# Patient Record
Sex: MALE | Race: WHITE | NOT HISPANIC OR LATINO | Employment: FULL TIME | URBAN - NONMETROPOLITAN AREA
[De-identification: names, ages, dates, MRNs, and addresses within clinical notes are randomized per-mention and may not be internally consistent; named-entity substitution may affect disease eponyms.]

---

## 2021-10-31 ENCOUNTER — HOSPITAL ENCOUNTER (EMERGENCY)
Facility: HOSPITAL | Age: 41
Discharge: HOME | End: 2021-10-31
Attending: EMERGENCY MEDICINE
Payer: COMMERCIAL

## 2021-10-31 VITALS
BODY MASS INDEX: 35.8 KG/M2 | DIASTOLIC BLOOD PRESSURE: 76 MMHG | WEIGHT: 294 LBS | TEMPERATURE: 98 F | SYSTOLIC BLOOD PRESSURE: 129 MMHG | HEART RATE: 96 BPM | OXYGEN SATURATION: 99 % | HEIGHT: 76 IN | RESPIRATION RATE: 18 BRPM

## 2021-10-31 DIAGNOSIS — K08.89 PAIN, DENTAL: Primary | ICD-10-CM

## 2021-10-31 PROCEDURE — 99284 EMERGENCY DEPT VISIT MOD MDM: CPT

## 2021-10-31 RX ORDER — HYDROCODONE BITARTRATE AND ACETAMINOPHEN 5; 325 MG/1; MG/1
1 TABLET ORAL EVERY 8 HOURS PRN
Qty: 15 TABLET | Refills: 0 | Status: SHIPPED | OUTPATIENT
Start: 2021-10-31 | End: 2021-11-05

## 2021-10-31 RX ORDER — PENICILLIN V POTASSIUM 500 MG/1
500 TABLET, FILM COATED ORAL 4 TIMES DAILY
Qty: 40 TABLET | Refills: 0 | Status: SHIPPED | OUTPATIENT
Start: 2021-10-31 | End: 2021-11-10

## 2022-05-18 ENCOUNTER — LAB VISIT (OUTPATIENT)
Dept: LAB | Facility: HOSPITAL | Age: 42
End: 2022-05-18
Attending: INTERNAL MEDICINE
Payer: COMMERCIAL

## 2022-05-18 DIAGNOSIS — Z12.5 SPECIAL SCREENING FOR MALIGNANT NEOPLASM OF PROSTATE: ICD-10-CM

## 2022-05-18 DIAGNOSIS — Z13.29 SCREENING FOR THYROID DISORDER: ICD-10-CM

## 2022-05-18 DIAGNOSIS — I10 ESSENTIAL HYPERTENSION, MALIGNANT: ICD-10-CM

## 2022-05-18 DIAGNOSIS — Z00.00 ROUTINE GENERAL MEDICAL EXAMINATION AT A HEALTH CARE FACILITY: Primary | ICD-10-CM

## 2022-05-18 DIAGNOSIS — Z72.0 TOBACCO ABUSE: ICD-10-CM

## 2022-05-18 LAB
ALBUMIN SERPL BCP-MCNC: 4 G/DL (ref 3.5–5.2)
ALP SERPL-CCNC: 63 U/L (ref 55–135)
ALT SERPL W/O P-5'-P-CCNC: 43 U/L (ref 10–44)
ANION GAP SERPL CALC-SCNC: 8 MMOL/L (ref 8–16)
AST SERPL-CCNC: 24 U/L (ref 10–40)
BASOPHILS # BLD AUTO: 0.1 K/UL (ref 0–0.2)
BASOPHILS NFR BLD: 1 % (ref 0–1.9)
BILIRUB SERPL-MCNC: 0.5 MG/DL (ref 0.1–1)
BUN SERPL-MCNC: 18 MG/DL (ref 6–20)
CALCIUM SERPL-MCNC: 9.4 MG/DL (ref 8.7–10.5)
CHLORIDE SERPL-SCNC: 105 MMOL/L (ref 95–110)
CHOLEST SERPL-MCNC: 117 MG/DL (ref 120–199)
CHOLEST/HDLC SERPL: 4.2 {RATIO} (ref 2–5)
CO2 SERPL-SCNC: 24 MMOL/L (ref 23–29)
COMPLEXED PSA SERPL-MCNC: 0.48 NG/ML (ref 0–4)
CREAT SERPL-MCNC: 1.4 MG/DL (ref 0.5–1.4)
DIFFERENTIAL METHOD: ABNORMAL
EOSINOPHIL # BLD AUTO: 0.5 K/UL (ref 0–0.5)
EOSINOPHIL NFR BLD: 5.2 % (ref 0–8)
ERYTHROCYTE [DISTWIDTH] IN BLOOD BY AUTOMATED COUNT: 13.2 % (ref 11.5–14.5)
EST. GFR  (AFRICAN AMERICAN): >60 ML/MIN/1.73 M^2
EST. GFR  (NON AFRICAN AMERICAN): >60 ML/MIN/1.73 M^2
ESTIMATED AVG GLUCOSE: 229 MG/DL (ref 68–131)
GLUCOSE SERPL-MCNC: 199 MG/DL (ref 70–110)
HBA1C MFR BLD: 9.6 % (ref 4–5.6)
HCT VFR BLD AUTO: 50.9 % (ref 40–54)
HDLC SERPL-MCNC: 28 MG/DL (ref 40–75)
HDLC SERPL: 23.9 % (ref 20–50)
HGB BLD-MCNC: 18.2 G/DL (ref 14–18)
IMM GRANULOCYTES # BLD AUTO: 0.1 K/UL (ref 0–0.04)
IMM GRANULOCYTES NFR BLD AUTO: 1 % (ref 0–0.5)
LDLC SERPL CALC-MCNC: 28.2 MG/DL (ref 63–159)
LYMPHOCYTES # BLD AUTO: 2.6 K/UL (ref 1–4.8)
LYMPHOCYTES NFR BLD: 26.4 % (ref 18–48)
MCH RBC QN AUTO: 31 PG (ref 27–31)
MCHC RBC AUTO-ENTMCNC: 35.8 G/DL (ref 32–36)
MCV RBC AUTO: 87 FL (ref 82–98)
MONOCYTES # BLD AUTO: 0.7 K/UL (ref 0.3–1)
MONOCYTES NFR BLD: 7.4 % (ref 4–15)
NEUTROPHILS # BLD AUTO: 5.8 K/UL (ref 1.8–7.7)
NEUTROPHILS NFR BLD: 59 % (ref 38–73)
NONHDLC SERPL-MCNC: 89 MG/DL
NRBC BLD-RTO: 0 /100 WBC
PLATELET # BLD AUTO: 231 K/UL (ref 150–450)
PMV BLD AUTO: 10.1 FL (ref 9.2–12.9)
POTASSIUM SERPL-SCNC: 4.3 MMOL/L (ref 3.5–5.1)
PROT SERPL-MCNC: 7.8 G/DL (ref 6–8.4)
RBC # BLD AUTO: 5.87 M/UL (ref 4.6–6.2)
SODIUM SERPL-SCNC: 137 MMOL/L (ref 136–145)
TRIGL SERPL-MCNC: 304 MG/DL (ref 30–150)
TSH SERPL DL<=0.005 MIU/L-ACNC: 0.89 UIU/ML (ref 0.4–4)
WBC # BLD AUTO: 9.86 K/UL (ref 3.9–12.7)

## 2022-05-18 PROCEDURE — 83036 HEMOGLOBIN GLYCOSYLATED A1C: CPT | Performed by: INTERNAL MEDICINE

## 2022-05-18 PROCEDURE — 80061 LIPID PANEL: CPT | Performed by: INTERNAL MEDICINE

## 2022-05-18 PROCEDURE — 85025 COMPLETE CBC W/AUTO DIFF WBC: CPT | Performed by: INTERNAL MEDICINE

## 2022-05-18 PROCEDURE — 84443 ASSAY THYROID STIM HORMONE: CPT | Performed by: INTERNAL MEDICINE

## 2022-05-18 PROCEDURE — 80053 COMPREHEN METABOLIC PANEL: CPT | Performed by: INTERNAL MEDICINE

## 2022-05-18 PROCEDURE — 36415 COLL VENOUS BLD VENIPUNCTURE: CPT | Performed by: INTERNAL MEDICINE

## 2022-05-18 PROCEDURE — 84153 ASSAY OF PSA TOTAL: CPT | Performed by: INTERNAL MEDICINE

## 2022-09-08 ENCOUNTER — HOSPITAL ENCOUNTER (EMERGENCY)
Facility: HOSPITAL | Age: 42
Discharge: HOME | End: 2022-09-08
Attending: FAMILY MEDICINE
Payer: COMMERCIAL

## 2022-09-08 VITALS
DIASTOLIC BLOOD PRESSURE: 86 MMHG | HEART RATE: 91 BPM | WEIGHT: 277.63 LBS | OXYGEN SATURATION: 98 % | TEMPERATURE: 98 F | HEIGHT: 76 IN | BODY MASS INDEX: 33.81 KG/M2 | SYSTOLIC BLOOD PRESSURE: 137 MMHG | RESPIRATION RATE: 18 BRPM

## 2022-09-08 DIAGNOSIS — U07.1 COVID-19 VIRUS DETECTED: ICD-10-CM

## 2022-09-08 DIAGNOSIS — U07.1 COVID-19: Primary | ICD-10-CM

## 2022-09-08 LAB
CTP QC/QA: YES
SARS-COV-2 RDRP RESP QL NAA+PROBE: POSITIVE

## 2022-09-08 PROCEDURE — U0002 COVID-19 LAB TEST NON-CDC: HCPCS | Performed by: FAMILY MEDICINE

## 2022-09-08 PROCEDURE — 99284 EMERGENCY DEPT VISIT MOD MDM: CPT

## 2022-09-08 RX ORDER — LISINOPRIL AND HYDROCHLOROTHIAZIDE 12.5; 2 MG/1; MG/1
1 TABLET ORAL DAILY
COMMUNITY
Start: 2022-08-05

## 2022-09-08 RX ORDER — SITAGLIPTIN 100 MG/1
100 TABLET, FILM COATED ORAL DAILY
COMMUNITY
Start: 2022-08-31

## 2022-09-08 RX ORDER — CETIRIZINE HYDROCHLORIDE 10 MG/1
10 TABLET ORAL DAILY
Qty: 30 TABLET | Refills: 0 | Status: SHIPPED | OUTPATIENT
Start: 2022-09-08 | End: 2023-09-08

## 2022-09-08 RX ORDER — METFORMIN HYDROCHLORIDE 1000 MG/1
1000 TABLET ORAL 2 TIMES DAILY
Status: ON HOLD | COMMUNITY
Start: 2022-08-31 | End: 2024-02-11

## 2022-09-08 RX ORDER — FENOFIBRATE 145 MG/1
145 TABLET, FILM COATED ORAL NIGHTLY
COMMUNITY
Start: 2022-08-05

## 2022-09-08 RX ORDER — GLIMEPIRIDE 4 MG/1
8 TABLET ORAL DAILY
Status: ON HOLD | COMMUNITY
Start: 2022-05-10 | End: 2024-02-11 | Stop reason: HOSPADM

## 2022-09-08 RX ORDER — ATORVASTATIN CALCIUM 10 MG/1
10 TABLET, FILM COATED ORAL DAILY
COMMUNITY
Start: 2022-08-05

## 2022-09-08 RX ORDER — GLIPIZIDE 10 MG/1
10 TABLET, FILM COATED, EXTENDED RELEASE ORAL EVERY MORNING
COMMUNITY
Start: 2022-08-15

## 2022-09-08 RX ORDER — ONDANSETRON 4 MG/1
4 TABLET, FILM COATED ORAL EVERY 6 HOURS
Qty: 12 TABLET | Refills: 0 | Status: ON HOLD | OUTPATIENT
Start: 2022-09-08 | End: 2024-02-11 | Stop reason: HOSPADM

## 2022-09-08 RX ORDER — ALLOPURINOL 300 MG/1
300 TABLET ORAL DAILY
COMMUNITY
Start: 2022-08-15

## 2022-09-08 NOTE — Clinical Note
"Jose"Penny Gil was seen and treated in our emergency department on 9/8/2022.  He may return to work on 09/14/2022.       If you have any questions or concerns, please don't hesitate to call.      Rudy Helms Jr., MD"

## 2022-09-08 NOTE — Clinical Note
"Jose"Penny Gil was seen and treated in our emergency department on 9/8/2022.     COVID-19 is present in our communities across the state. There is limited testing for COVID at this time, so not all patients can be tested. In this situation, your employee meets the following criteria:    Jose Gil has met the criteria for COVID-19 testing and has a POSITIVE result. He can return to work once they are asymptomatic for 24 hours without the use of fever reducing medications AND at least five days from the first positive result. A mask is recommended for 5 days post quarantine.     If you have any questions or concerns, or if I can be of further assistance, please do not hesitate to contact me.    Sincerely,             Barb Garcia RN"

## 2022-09-08 NOTE — ED PROVIDER NOTES
"Encounter Date: 9/8/2022       History     Chief Complaint   Patient presents with    COVID-19 Concerns     Pt stated he tested positive for COVID this morning and "needs a doctor's test to get paid at work".       URI  The primary symptoms include fatigue, sore throat, cough, nausea, myalgias and arthralgias. Primary symptoms do not include fever, headaches, ear pain, swollen glands, wheezing, abdominal pain, vomiting or rash. The current episode started 1 to 2 hours ago. This is a new problem.   The fatigue began today.   The sore throat began today. The sore throat is not accompanied by trouble swallowing, drooling, hoarse voice or stridor.   The cough began today. The cough is non-productive.   Myalgias began today. The myalgias are generalized. The myalgias are not associated with weakness, tenderness or swelling.   Arthralgias began today. The arthralgias are not associated with joint swelling, limited range of motion or stiffness.   Symptoms associated with the illness include chills and rhinorrhea. The illness is not associated with plugged ear sensation, facial pain, sinus pressure or congestion.   Review of patient's allergies indicates:  No Known Allergies  Past Medical History:   Diagnosis Date    Diabetes mellitus     Gout, unspecified     Hypertension      History reviewed. No pertinent surgical history.  History reviewed. No pertinent family history.     Review of Systems   Constitutional:  Positive for chills and fatigue. Negative for fever.   HENT:  Positive for rhinorrhea and sore throat. Negative for congestion, drooling, ear pain, hoarse voice, sinus pressure and trouble swallowing.    Respiratory:  Positive for cough. Negative for wheezing and stridor.    Gastrointestinal:  Positive for nausea. Negative for abdominal pain and vomiting.   Musculoskeletal:  Positive for arthralgias and myalgias. Negative for stiffness.   Skin:  Negative for rash.   Neurological:  Negative for weakness and " headaches.   All other systems reviewed and are negative.    Physical Exam     Initial Vitals [09/08/22 0842]   BP Pulse Resp Temp SpO2   137/86 91 18 97.6 °F (36.4 °C) 98 %      MAP       --         Physical Exam    Nursing note and vitals reviewed.  Constitutional: Vital signs are normal. He appears well-developed and well-nourished. He is not diaphoretic.  Non-toxic appearance. He does not have a sickly appearance.   HENT:   Head: Normocephalic and atraumatic.   Right Ear: Hearing, tympanic membrane, external ear and ear canal normal.   Left Ear: Tympanic membrane, external ear and ear canal normal.   Nose: Mucosal edema and rhinorrhea present. Right sinus exhibits no maxillary sinus tenderness and no frontal sinus tenderness. Left sinus exhibits no maxillary sinus tenderness and no frontal sinus tenderness.   Mouth/Throat: Uvula is midline, oropharynx is clear and moist and mucous membranes are normal.   Eyes: Conjunctivae, EOM and lids are normal. Pupils are equal, round, and reactive to light. Lids are everted and swept, no foreign bodies found.   Neck: Trachea normal and phonation normal. Neck supple.   Normal range of motion.   Full passive range of motion without pain.     Cardiovascular:  Normal rate, regular rhythm, normal heart sounds, intact distal pulses and normal pulses.           Abdominal: Abdomen is soft. Bowel sounds are normal. There is no abdominal tenderness.   Musculoskeletal:      Cervical back: Normal, full passive range of motion without pain, normal range of motion and neck supple.      Thoracic back: Normal.      Lumbar back: Normal.     Neurological: He is alert and oriented to person, place, and time. He has normal strength. No cranial nerve deficit or sensory deficit.   Skin: Skin is intact.   Psychiatric: He has a normal mood and affect. His speech is normal and behavior is normal.       ED Course   Procedures  Labs Reviewed   SARS-COV-2 RDRP GENE - Abnormal; Notable for the  following components:       Result Value    POC Rapid COVID Positive (*)     All other components within normal limits    Narrative:     This test utilizes isothermal nucleic acid amplification   technology to detect the SARS-CoV-2 RdRp nucleic acid segment.   The analytical sensitivity (limit of detection) is 125 genome   equivalents/mL.   A POSITIVE result implies infection with the SARS-CoV-2 virus;   the patient is presumed to be contagious.     A NEGATIVE result means that SARS-CoV-2 nucleic acids are not   present above the limit of detection. A NEGATIVE result should be   treated as presumptive. It does not rule out the possibility of   COVID-19 and should not be the sole basis for treatment decisions.   If COVID-19 is strongly suspected based on clinical and exposure   history, re-testing using an alternate molecular assay should be   considered.   This test is only for use under the Food and Drug   Administration s Emergency Use Authorization (EUA).   Commercial kits are provided by [employer].   Performance characteristics of the EUA have been independently   verified by [facility] Department of   Pathology and Laboratory Medicine.   _________________________________________________________________   The authorized Fact Sheet for Healthcare Providers and the authorized Fact   Sheet for Patients of the ID NOW COVID-19 are available on the FDA   website:     https://www.fda.gov/media/059071/download  https://www.fda.gov/media/756702/download                  Imaging Results    None          Medications - No data to display  Medical Decision Making:   ED Management:  Patient is afebrile, no acute distress, tolerating p.o. fluids.  No indication for admission at this time.  Discussed the patient risks and benefits medication including side effects.  Patient reports he understands and would like to be started on paxlovid.  Discussed with patient need to follow-up with PCP for repeat exam in  48 hours and symptoms increase or worsen to return to ED or see PCP.  Patient reports he understands and is ready for discharge home                    Clinical Impression:   Final diagnoses:  [U07.1] COVID-19 (Primary)        ED Disposition Condition    Discharge Stable          ED Prescriptions       Medication Sig Dispense Start Date End Date Auth. Provider    nirmatrelvir-ritonavir 300 mg (150 mg x 2)-100 mg copackaged tablets (EUA) Take 3 tablets by mouth 2 (two) times daily for 5 days. Each dose contains 2 nirmatrelvir (pink tablets) and 1 ritonavir (white tablet). Take all 3 tablets together 30 tablet 9/8/2022 9/13/2022 Rudy Helms Jr., MD    ondansetron (ZOFRAN) 4 MG tablet Take 1 tablet (4 mg total) by mouth every 6 (six) hours. 12 tablet 9/8/2022 -- Rudy Helms Jr., MD    cetirizine (ZYRTEC) 10 MG tablet Take 1 tablet (10 mg total) by mouth once daily. 30 tablet 9/8/2022 9/8/2023 Rudy Helms Jr., MD          Follow-up Information       Follow up With Specialties Details Why Contact Info    Annabella Issa MD Internal Medicine In 2 days As needed, If symptoms worsen [address] [address]  [phone]               Rudy Helms Jr., MD  09/08/22 6109

## 2024-02-08 ENCOUNTER — HOSPITAL ENCOUNTER (INPATIENT)
Facility: HOSPITAL | Age: 44
LOS: 3 days | Discharge: HOME | DRG: 617 | End: 2024-02-11
Attending: INTERNAL MEDICINE | Admitting: INTERNAL MEDICINE
Payer: COMMERCIAL

## 2024-02-08 DIAGNOSIS — Z01.818 PREOP EXAMINATION: ICD-10-CM

## 2024-02-08 DIAGNOSIS — M86.9 OSTEOMYELITIS OF GREAT TOE OF RIGHT FOOT: Primary | ICD-10-CM

## 2024-02-08 PROBLEM — M10.9 GOUT: Status: ACTIVE | Noted: 2024-02-08

## 2024-02-08 PROBLEM — E78.2 MIXED HYPERLIPIDEMIA: Status: ACTIVE | Noted: 2024-02-08

## 2024-02-08 PROBLEM — E11.69 TYPE 2 DIABETES MELLITUS WITH OTHER SPECIFIED COMPLICATION: Status: ACTIVE | Noted: 2017-10-20

## 2024-02-08 PROBLEM — I10 HYPERTENSION: Status: ACTIVE | Noted: 2024-02-08

## 2024-02-08 LAB
ALBUMIN SERPL BCP-MCNC: 3.8 G/DL (ref 3.5–5.2)
ALP SERPL-CCNC: 62 U/L (ref 55–135)
ALT SERPL W/O P-5'-P-CCNC: 32 U/L (ref 10–44)
ANION GAP SERPL CALC-SCNC: 7 MMOL/L (ref 3–11)
APTT PPP: 25.8 SEC (ref 21–32)
AST SERPL-CCNC: 14 U/L (ref 10–40)
BILIRUB SERPL-MCNC: 0.3 MG/DL (ref 0.1–1)
BUN SERPL-MCNC: 20 MG/DL (ref 6–20)
CALCIUM SERPL-MCNC: 9.6 MG/DL (ref 8.7–10.5)
CHLORIDE SERPL-SCNC: 106 MMOL/L (ref 95–110)
CO2 SERPL-SCNC: 23 MMOL/L (ref 23–29)
CREAT SERPL-MCNC: 1.4 MG/DL (ref 0.5–1.4)
CRP SERPL-MCNC: 0.72 MG/DL (ref 0–0.75)
ERYTHROCYTE [SEDIMENTATION RATE] IN BLOOD: 3 MM/HR (ref 0–10)
EST. GFR  (NO RACE VARIABLE): >60 ML/MIN/1.73 M^2
GLUCOSE SERPL-MCNC: 245 MG/DL (ref 70–110)
INR PPP: 0.9 (ref 0.8–1.2)
MAGNESIUM SERPL-MCNC: 2.2 MG/DL (ref 1.6–2.6)
OHS QRS DURATION: 82 MS
OHS QTC CALCULATION: 427 MS
POCT GLUCOSE: 244 MG/DL (ref 70–110)
POTASSIUM SERPL-SCNC: 4 MMOL/L (ref 3.5–5.1)
PROT SERPL-MCNC: 7.4 G/DL (ref 6–8.4)
PROTHROMBIN TIME: 10.3 SEC (ref 9–12.5)
SODIUM SERPL-SCNC: 136 MMOL/L (ref 136–145)

## 2024-02-08 PROCEDURE — 36415 COLL VENOUS BLD VENIPUNCTURE: CPT | Mod: XB | Performed by: PODIATRIST

## 2024-02-08 PROCEDURE — 25000003 PHARM REV CODE 250: Performed by: PODIATRIST

## 2024-02-08 PROCEDURE — 93005 ELECTROCARDIOGRAM TRACING: CPT

## 2024-02-08 PROCEDURE — 85651 RBC SED RATE NONAUTOMATED: CPT | Performed by: PODIATRIST

## 2024-02-08 PROCEDURE — 63600175 PHARM REV CODE 636 W HCPCS: Performed by: PODIATRIST

## 2024-02-08 PROCEDURE — 86140 C-REACTIVE PROTEIN: CPT | Performed by: PODIATRIST

## 2024-02-08 PROCEDURE — 36415 COLL VENOUS BLD VENIPUNCTURE: CPT

## 2024-02-08 PROCEDURE — 83735 ASSAY OF MAGNESIUM: CPT

## 2024-02-08 PROCEDURE — 11000001 HC ACUTE MED/SURG PRIVATE ROOM

## 2024-02-08 PROCEDURE — 85610 PROTHROMBIN TIME: CPT | Performed by: PODIATRIST

## 2024-02-08 PROCEDURE — 80053 COMPREHEN METABOLIC PANEL: CPT

## 2024-02-08 PROCEDURE — 85730 THROMBOPLASTIN TIME PARTIAL: CPT | Performed by: PODIATRIST

## 2024-02-08 PROCEDURE — 25000003 PHARM REV CODE 250

## 2024-02-08 PROCEDURE — 87040 BLOOD CULTURE FOR BACTERIA: CPT | Mod: 59 | Performed by: PODIATRIST

## 2024-02-08 PROCEDURE — 93010 ELECTROCARDIOGRAM REPORT: CPT | Mod: ,,, | Performed by: INTERNAL MEDICINE

## 2024-02-08 RX ORDER — HYDROCHLOROTHIAZIDE 12.5 MG/1
12.5 TABLET ORAL DAILY
Status: DISCONTINUED | OUTPATIENT
Start: 2024-02-09 | End: 2024-02-11 | Stop reason: HOSPADM

## 2024-02-08 RX ORDER — FENOFIBRATE 145 MG/1
145 TABLET, FILM COATED ORAL NIGHTLY
Status: DISCONTINUED | OUTPATIENT
Start: 2024-02-08 | End: 2024-02-11 | Stop reason: HOSPADM

## 2024-02-08 RX ORDER — SODIUM CHLORIDE 0.9 % (FLUSH) 0.9 %
10 SYRINGE (ML) INJECTION EVERY 12 HOURS PRN
Status: DISCONTINUED | OUTPATIENT
Start: 2024-02-08 | End: 2024-02-11 | Stop reason: HOSPADM

## 2024-02-08 RX ORDER — ACETAMINOPHEN 325 MG/1
650 TABLET ORAL EVERY 6 HOURS PRN
Status: DISCONTINUED | OUTPATIENT
Start: 2024-02-08 | End: 2024-02-11 | Stop reason: HOSPADM

## 2024-02-08 RX ORDER — LISINOPRIL 20 MG/1
20 TABLET ORAL DAILY
Status: DISCONTINUED | OUTPATIENT
Start: 2024-02-09 | End: 2024-02-11 | Stop reason: HOSPADM

## 2024-02-08 RX ORDER — GLUCAGON 1 MG
1 KIT INJECTION
Status: DISCONTINUED | OUTPATIENT
Start: 2024-02-08 | End: 2024-02-11 | Stop reason: HOSPADM

## 2024-02-08 RX ORDER — ATORVASTATIN CALCIUM 10 MG/1
10 TABLET, FILM COATED ORAL NIGHTLY
Status: DISCONTINUED | OUTPATIENT
Start: 2024-02-08 | End: 2024-02-09

## 2024-02-08 RX ORDER — TALC
6 POWDER (GRAM) TOPICAL NIGHTLY PRN
Status: DISCONTINUED | OUTPATIENT
Start: 2024-02-08 | End: 2024-02-11 | Stop reason: HOSPADM

## 2024-02-08 RX ORDER — ONDANSETRON HYDROCHLORIDE 2 MG/ML
4 INJECTION, SOLUTION INTRAVENOUS EVERY 8 HOURS PRN
Status: DISCONTINUED | OUTPATIENT
Start: 2024-02-08 | End: 2024-02-11 | Stop reason: HOSPADM

## 2024-02-08 RX ORDER — IBUPROFEN 200 MG
16 TABLET ORAL
Status: DISCONTINUED | OUTPATIENT
Start: 2024-02-08 | End: 2024-02-11 | Stop reason: HOSPADM

## 2024-02-08 RX ORDER — IBUPROFEN 200 MG
24 TABLET ORAL
Status: DISCONTINUED | OUTPATIENT
Start: 2024-02-08 | End: 2024-02-11 | Stop reason: HOSPADM

## 2024-02-08 RX ORDER — TRAMADOL HYDROCHLORIDE 50 MG/1
50 TABLET ORAL EVERY 8 HOURS PRN
Status: DISCONTINUED | OUTPATIENT
Start: 2024-02-08 | End: 2024-02-10

## 2024-02-08 RX ORDER — HYDROCODONE BITARTRATE AND ACETAMINOPHEN 5; 325 MG/1; MG/1
1 TABLET ORAL EVERY 8 HOURS PRN
Status: DISCONTINUED | OUTPATIENT
Start: 2024-02-08 | End: 2024-02-10

## 2024-02-08 RX ORDER — ALLOPURINOL 300 MG/1
300 TABLET ORAL DAILY
Status: DISCONTINUED | OUTPATIENT
Start: 2024-02-09 | End: 2024-02-11 | Stop reason: HOSPADM

## 2024-02-08 RX ADMIN — PIPERACILLIN SODIUM AND TAZOBACTAM SODIUM 4.5 G: 4; .5 INJECTION, POWDER, FOR SOLUTION INTRAVENOUS at 02:02

## 2024-02-08 RX ADMIN — PIPERACILLIN SODIUM AND TAZOBACTAM SODIUM 4.5 G: 4; .5 INJECTION, POWDER, FOR SOLUTION INTRAVENOUS at 09:02

## 2024-02-08 RX ADMIN — FENOFIBRATE 145 MG: 145 TABLET, FILM COATED ORAL at 09:02

## 2024-02-08 NOTE — ASSESSMENT & PLAN NOTE
X-ray right foot obtained demonstrates soft tissue abnormality along the medial D IP of the big toe with a 6.5 mm erosive area at the base of the distal phalanx of the big toe 6.5 mm could represent osteomyelitis or lesion from gout.  This is consistent with in office x-ray findings.  Podiatry is following, plans on right great toe amputation tomorrow.  Blood cultures are pending.  On Zosyn for antibiotic coverage.  NPO after midnight.

## 2024-02-08 NOTE — SUBJECTIVE & OBJECTIVE
Past Medical History:   Diagnosis Date    Diabetes mellitus     Gout, unspecified     Hypertension        No past surgical history on file.    Review of patient's allergies indicates:  No Known Allergies    No current facility-administered medications on file prior to encounter.     Current Outpatient Medications on File Prior to Encounter   Medication Sig    allopurinoL (ZYLOPRIM) 300 MG tablet Take 300 mg by mouth once daily.    atorvastatin (LIPITOR) 10 MG tablet Take 10 mg by mouth once daily.    fenofibrate (TRICOR) 145 MG tablet Take 145 mg by mouth every evening.    glimepiride (AMARYL) 4 MG tablet Take 8 mg by mouth once daily.    glipiZIDE (GLUCOTROL) 10 MG TR24 Take 10 mg by mouth every morning.    JANUVIA 100 mg Tab Take 100 mg by mouth once daily.    cetirizine (ZYRTEC) 10 MG tablet Take 1 tablet (10 mg total) by mouth once daily.    lisinopriL-hydrochlorothiazide (PRINZIDE,ZESTORETIC) 20-12.5 mg per tablet Take 1 tablet by mouth once daily.    metFORMIN (GLUCOPHAGE) 1000 MG tablet Take 1,000 mg by mouth 2 (two) times daily.    ondansetron (ZOFRAN) 4 MG tablet Take 1 tablet (4 mg total) by mouth every 6 (six) hours.     Family History    None       Tobacco Use    Smoking status: Not on file    Smokeless tobacco: Not on file   Substance and Sexual Activity    Alcohol use: Not on file    Drug use: Not on file    Sexual activity: Not on file     Review of Systems   Constitutional:  Positive for appetite change. Negative for activity change, chills and fever.   HENT:  Negative for ear pain, mouth sores, nosebleeds and sore throat.    Eyes:  Negative for visual disturbance.   Respiratory:  Negative for cough, shortness of breath and wheezing.    Cardiovascular:  Negative for chest pain, palpitations and leg swelling.   Gastrointestinal:  Negative for abdominal distention, abdominal pain, blood in stool, constipation, diarrhea, nausea and vomiting.   Endocrine: Negative for polyphagia.   Genitourinary:   Negative for difficulty urinating, dysuria, flank pain and frequency.   Musculoskeletal:  Positive for arthralgias. Negative for back pain and myalgias.   Skin:  Positive for wound. Negative for rash.   Neurological:  Negative for dizziness, tremors, seizures, syncope, facial asymmetry, speech difficulty, weakness and headaches.   Hematological:  Negative for adenopathy.   Psychiatric/Behavioral:  Negative for agitation, confusion and hallucinations. The patient is not nervous/anxious.      Objective:     Vital Signs (Most Recent):    Vital Signs (24h Range):        Weight: 125.9 kg (277 lb 9 oz)  Body mass index is 33.79 kg/m².     Physical Exam  Constitutional:       General: He is not in acute distress.     Appearance: Normal appearance. He is obese. He is not ill-appearing or toxic-appearing.   HENT:      Head: Normocephalic and atraumatic.      Nose: No congestion or rhinorrhea.      Mouth/Throat:      Dentition: Abnormal dentition. Dental caries present.      Pharynx: No oropharyngeal exudate.   Eyes:      General: No scleral icterus.  Cardiovascular:      Rate and Rhythm: Normal rate and regular rhythm.      Pulses: Normal pulses.      Heart sounds: Normal heart sounds. No murmur heard.     No friction rub. No gallop.   Pulmonary:      Effort: Pulmonary effort is normal. No respiratory distress.      Breath sounds: Normal breath sounds. No stridor. No wheezing, rhonchi or rales.   Chest:      Chest wall: No tenderness.   Abdominal:      General: Bowel sounds are normal. There is no distension.      Palpations: Abdomen is soft. There is no mass.      Tenderness: There is no abdominal tenderness. There is no right CVA tenderness, left CVA tenderness, guarding or rebound.      Hernia: No hernia is present.   Musculoskeletal:         General: No swelling, tenderness or deformity.      Cervical back: Normal range of motion and neck supple. No rigidity.      Right lower leg: No edema.      Left lower leg: No edema.  "  Lymphadenopathy:      Cervical: No cervical adenopathy.   Skin:     General: Skin is warm and dry.      Capillary Refill: Capillary refill takes less than 2 seconds.      Coloration: Skin is not jaundiced or pale.      Comments: Wound to right great toe   Neurological:      General: No focal deficit present.      Mental Status: He is alert and oriented to person, place, and time.      Motor: No weakness.      Gait: Gait normal.   Psychiatric:         Mood and Affect: Mood normal.         Behavior: Behavior normal.         Thought Content: Thought content normal.         Judgment: Judgment normal.                Significant Labs: All pertinent labs within the past 24 hours have been reviewed.  CBC: No results for input(s): "WBC", "HGB", "HCT", "PLT" in the last 48 hours.  CMP: No results for input(s): "NA", "K", "CL", "CO2", "GLU", "BUN", "CREATININE", "CALCIUM", "PROT", "ALBUMIN", "BILITOT", "ALKPHOS", "AST", "ALT", "ANIONGAP", "EGFRNONAA" in the last 48 hours.    Invalid input(s): "ESTGFAFRICA"    Significant Imaging: I have reviewed all pertinent imaging results/findings within the past 24 hours.  "

## 2024-02-08 NOTE — HPI
"44-year-old  male with a past medical history of hypertension, hyperlipidemia, type 2 diabetes with diabetic neuropathy, gout obesity, tobacco abuse chronic ulcer of right great toe for 1 year directly admitted by Podiatry for anticipated right great toe amputation tomorrow.  Patient failed outpatient oral antibiotics as well as wound care.  In office x-rays demonstrated bony erosion consistent with osteomyelitis.  Chest x-ray obtained negative for any acute cardiopulmonary abnormalities.  X-ray of right foot demonstrates  Soft tissue abnormality along the medial DIP of the big toe with a 6.5 mm wrote a very at the bases distal phalanx of the big toe which could represent osteomyelitis or lesion from gout.  This appears to be consistent with the x-ray obtained in podiatry office. Patient reports he is feeling well other than some discomfort to his right foot, denies any recent fevers, cough, congestion, chest pain, dyspnea.  Patient is declining insulin per sliding scale due to being a , stating "once that is on my history I will never be able to get my license back."    Patient reports he does not check his blood sugars at home, since unsure his most recent levels.   Coags within acceptable limit, sed rate and CRP not elevated.    "

## 2024-02-08 NOTE — PLAN OF CARE
[facility]  Initial Discharge Assessment       Primary Care Provider: Annabella Issa MD    Admission Diagnosis: Osteomyelitis of great toe of right foot [M86.9]    Admission Date: 2/8/2024  Expected Discharge Date:          Payor: [employer] / Plan: [employer] ([employer]) / Product Type: Commercial /     Extended Emergency Contact Information  Primary Emergency Contact: NIMO CUELLAR  Mobile Phone: [phone]  Relation: Spouse  Preferred language: English   needed? No    Discharge Plan A: Home with family, Home Health  Discharge Plan B: Home with family, Home      [facility] [address] [place], [place] [address]  [facility] [address]  Phone: [phone] Fax: [phone]      Initial Assessment (most recent)       Adult Discharge Assessment - 02/08/24 1416          Discharge Assessment    Assessment Type Discharge Planning Assessment     Confirmed/corrected address, phone number and insurance Yes     Confirmed Demographics Correct on Facesheet     Source of Information patient     Reason For Admission Osteomyelitis of great toe of right foot     People in Home child(dhiraj), dependent;spouse     Prior to hospitilization cognitive status: Alert/Oriented     Current cognitive status: Alert/Oriented     Walking or Climbing Stairs Difficulty --   Independent prior to being admitted to the hospital    Home Layout Able to live on 1st floor     Equipment Currently Used at Home glucometer     Readmission within 30 days? No     Patient currently being followed by outpatient case management? No     Do you currently have service(s) that help you manage your care at home? No     Do you take prescription medications? Yes     Do you have prescription coverage? Yes     Coverage [employer] - [employer]) -     Do you have any problems affording any of your prescribed medications? TBD     Is the patient taking medications  "as prescribed? yes     How do you get to doctors appointments? car, drives self     Are you on dialysis? No     Do you take coumadin? No     Discharge Plan A Home with family;Home Health     Discharge Plan B Home with family;Home     DME Needed Upon Discharge  other (see comments)   TBD    Discharge Plan discussed with: Patient        Physical Activity    On average, how many days per week do you engage in moderate to strenuous exercise (like a brisk walk)? 0 days     On average, how many minutes do you engage in exercise at this level? 0 min        Financial Resource Strain    How hard is it for you to pay for the very basics like food, housing, medical care, and heating? Not hard at all   "Medical care."       Housing Stability    In the last 12 months, was there a time when you were not able to pay the mortgage or rent on time? No     In the last 12 months, how many places have you lived? 1     In the last 12 months, was there a time when you did not have a steady place to sleep or slept in a shelter (including now)? No        Transportation Needs    In the past 12 months, has lack of transportation kept you from medical appointments or from getting medications? No     In the past 12 months, has lack of transportation kept you from meetings, work, or from getting things needed for daily living? No        Food Insecurity    Within the past 12 months, you worried that your food would run out before you got the money to buy more. Never true     Within the past 12 months, the food you bought just didn't last and you didn't have money to get more. Never true        Stress    Do you feel stress - tense, restless, nervous, or anxious, or unable to sleep at night because your mind is troubled all the time - these days? Not at all        Social Connections    In a typical week, how many times do you talk on the phone with family, friends, or neighbors? More than three times a week     How often do you get together with " friends or relatives? More than three times a week     How often do you attend Yarsani or Judaism services? Never     Do you belong to any clubs or organizations such as Yarsani groups, unions, fraternal or athletic groups, or school groups? No     How often do you attend meetings of the clubs or organizations you belong to? Never     Are you , , , , never , or living with a partner?         Alcohol Use    Q1: How often do you have a drink containing alcohol? 2-4 times a month     Q2: How many drinks containing alcohol do you have on a typical day when you are drinking? 5 or 6     Q3: How often do you have six or more drinks on one occasion? Never                 Initial discharge assessment is completed. Spoke to the patient in his room. He was awake, alert, and oriented to the questions. The patient is independent, and he still works. He lives with his family. The patient expressed that he is concerned about paying for his hospital stay and medications. I was able to provide the patient with the contact information of Orqu[employer] ESCOTO, who assist patient with financial concerns. I was also able to provide the patient with information to [facility] pharmacy as well. Contact information was left in the patient's room. MICHA/ANGELA will remain available.

## 2024-02-08 NOTE — ASSESSMENT & PLAN NOTE
Patient is glipizide 10 mg daily, 1000 mg b.I.d. Farxiga daily.  Patient is refusing sliding scale insulin due to being fearful that he will not be able to obtain his license to continue to be a .

## 2024-02-09 ENCOUNTER — ANESTHESIA (OUTPATIENT)
Dept: SURGERY | Facility: HOSPITAL | Age: 44
DRG: 617 | End: 2024-02-09
Payer: COMMERCIAL

## 2024-02-09 ENCOUNTER — ANESTHESIA EVENT (OUTPATIENT)
Dept: SURGERY | Facility: HOSPITAL | Age: 44
DRG: 617 | End: 2024-02-09
Payer: COMMERCIAL

## 2024-02-09 LAB
ALBUMIN SERPL BCP-MCNC: 3.4 G/DL (ref 3.5–5.2)
ALP SERPL-CCNC: 62 U/L (ref 55–135)
ALT SERPL W/O P-5'-P-CCNC: 26 U/L (ref 10–44)
ANION GAP SERPL CALC-SCNC: 3 MMOL/L (ref 3–11)
AST SERPL-CCNC: 13 U/L (ref 10–40)
BASOPHILS # BLD AUTO: 0.13 K/UL (ref 0–0.2)
BASOPHILS NFR BLD: 1.2 % (ref 0–1.9)
BILIRUB SERPL-MCNC: 0.3 MG/DL (ref 0.1–1)
BUN SERPL-MCNC: 23 MG/DL (ref 6–20)
CALCIUM SERPL-MCNC: 9.5 MG/DL (ref 8.7–10.5)
CHLORIDE SERPL-SCNC: 107 MMOL/L (ref 95–110)
CO2 SERPL-SCNC: 28 MMOL/L (ref 23–29)
CREAT SERPL-MCNC: 1.5 MG/DL (ref 0.5–1.4)
DIFFERENTIAL METHOD BLD: ABNORMAL
EOSINOPHIL # BLD AUTO: 0.7 K/UL (ref 0–0.5)
EOSINOPHIL NFR BLD: 5.8 % (ref 0–8)
ERYTHROCYTE [DISTWIDTH] IN BLOOD BY AUTOMATED COUNT: 13.1 % (ref 11.5–14.5)
EST. GFR  (NO RACE VARIABLE): 58.5 ML/MIN/1.73 M^2
GLUCOSE SERPL-MCNC: 175 MG/DL (ref 70–110)
HCT VFR BLD AUTO: 47 % (ref 40–54)
HGB BLD-MCNC: 16 G/DL (ref 14–18)
IMM GRANULOCYTES # BLD AUTO: 0.09 K/UL (ref 0–0.04)
IMM GRANULOCYTES NFR BLD AUTO: 0.8 % (ref 0–0.5)
LYMPHOCYTES # BLD AUTO: 3.8 K/UL (ref 1–4.8)
LYMPHOCYTES NFR BLD: 34.2 % (ref 18–48)
MAGNESIUM SERPL-MCNC: 2.1 MG/DL (ref 1.6–2.6)
MCH RBC QN AUTO: 30.5 PG (ref 27–31)
MCHC RBC AUTO-ENTMCNC: 34 G/DL (ref 32–36)
MCV RBC AUTO: 90 FL (ref 82–98)
MONOCYTES # BLD AUTO: 0.9 K/UL (ref 0.3–1)
MONOCYTES NFR BLD: 8.1 % (ref 4–15)
NEUTROPHILS # BLD AUTO: 5.6 K/UL (ref 1.8–7.7)
NEUTROPHILS NFR BLD: 49.9 % (ref 38–73)
NRBC BLD-RTO: 0 /100 WBC
PLATELET # BLD AUTO: 239 K/UL (ref 150–450)
PMV BLD AUTO: 10.6 FL (ref 9.2–12.9)
POCT GLUCOSE: 149 MG/DL (ref 70–110)
POCT GLUCOSE: 266 MG/DL (ref 70–110)
POTASSIUM SERPL-SCNC: 3.9 MMOL/L (ref 3.5–5.1)
PROT SERPL-MCNC: 6.8 G/DL (ref 6–8.4)
RBC # BLD AUTO: 5.24 M/UL (ref 4.6–6.2)
SODIUM SERPL-SCNC: 138 MMOL/L (ref 136–145)
WBC # BLD AUTO: 11.2 K/UL (ref 3.9–12.7)

## 2024-02-09 PROCEDURE — 37000009 HC ANESTHESIA EA ADD 15 MINS: Performed by: PODIATRIST

## 2024-02-09 PROCEDURE — 36415 COLL VENOUS BLD VENIPUNCTURE: CPT

## 2024-02-09 PROCEDURE — 99900035 HC TECH TIME PER 15 MIN (STAT)

## 2024-02-09 PROCEDURE — 0Y6P0Z2 DETACHMENT AT RIGHT 1ST TOE, MID, OPEN APPROACH: ICD-10-PCS | Performed by: PODIATRIST

## 2024-02-09 PROCEDURE — 37000008 HC ANESTHESIA 1ST 15 MINUTES: Performed by: PODIATRIST

## 2024-02-09 PROCEDURE — 63600175 PHARM REV CODE 636 W HCPCS: Performed by: PODIATRIST

## 2024-02-09 PROCEDURE — 25000003 PHARM REV CODE 250: Performed by: INTERNAL MEDICINE

## 2024-02-09 PROCEDURE — 63600175 PHARM REV CODE 636 W HCPCS: Performed by: NURSE ANESTHETIST, CERTIFIED REGISTERED

## 2024-02-09 PROCEDURE — 36000707: Performed by: PODIATRIST

## 2024-02-09 PROCEDURE — 83735 ASSAY OF MAGNESIUM: CPT

## 2024-02-09 PROCEDURE — 94799 UNLISTED PULMONARY SVC/PX: CPT | Mod: XB

## 2024-02-09 PROCEDURE — 94761 N-INVAS EAR/PLS OXIMETRY MLT: CPT

## 2024-02-09 PROCEDURE — 87070 CULTURE OTHR SPECIMN AEROBIC: CPT | Performed by: INTERNAL MEDICINE

## 2024-02-09 PROCEDURE — 36000706: Performed by: PODIATRIST

## 2024-02-09 PROCEDURE — 85025 COMPLETE CBC W/AUTO DIFF WBC: CPT

## 2024-02-09 PROCEDURE — 25000003 PHARM REV CODE 250

## 2024-02-09 PROCEDURE — 99900031 HC PATIENT EDUCATION (STAT)

## 2024-02-09 PROCEDURE — 25000003 PHARM REV CODE 250: Performed by: NURSE ANESTHETIST, CERTIFIED REGISTERED

## 2024-02-09 PROCEDURE — 87075 CULTR BACTERIA EXCEPT BLOOD: CPT | Performed by: INTERNAL MEDICINE

## 2024-02-09 PROCEDURE — 80053 COMPREHEN METABOLIC PANEL: CPT

## 2024-02-09 PROCEDURE — 11000001 HC ACUTE MED/SURG PRIVATE ROOM

## 2024-02-09 PROCEDURE — 87147 CULTURE TYPE IMMUNOLOGIC: CPT | Performed by: INTERNAL MEDICINE

## 2024-02-09 PROCEDURE — 25000003 PHARM REV CODE 250: Performed by: PODIATRIST

## 2024-02-09 RX ORDER — LIDOCAINE HYDROCHLORIDE 10 MG/ML
INJECTION, SOLUTION INTRAVENOUS
Status: DISCONTINUED | OUTPATIENT
Start: 2024-02-09 | End: 2024-02-09

## 2024-02-09 RX ORDER — METFORMIN HYDROCHLORIDE 500 MG/1
500 TABLET ORAL 2 TIMES DAILY WITH MEALS
Status: DISCONTINUED | OUTPATIENT
Start: 2024-02-09 | End: 2024-02-11 | Stop reason: HOSPADM

## 2024-02-09 RX ORDER — BUPIVACAINE HYDROCHLORIDE 5 MG/ML
INJECTION, SOLUTION PERINEURAL
Status: DISCONTINUED | OUTPATIENT
Start: 2024-02-09 | End: 2024-02-09 | Stop reason: HOSPADM

## 2024-02-09 RX ORDER — PROPOFOL 10 MG/ML
INJECTION, EMULSION INTRAVENOUS
Status: DISCONTINUED | OUTPATIENT
Start: 2024-02-09 | End: 2024-02-09

## 2024-02-09 RX ORDER — ATORVASTATIN CALCIUM 10 MG/1
10 TABLET, FILM COATED ORAL DAILY
Status: DISCONTINUED | OUTPATIENT
Start: 2024-02-09 | End: 2024-02-11 | Stop reason: HOSPADM

## 2024-02-09 RX ORDER — MIDAZOLAM HYDROCHLORIDE 1 MG/ML
INJECTION INTRAMUSCULAR; INTRAVENOUS
Status: DISCONTINUED | OUTPATIENT
Start: 2024-02-09 | End: 2024-02-09

## 2024-02-09 RX ORDER — SODIUM CHLORIDE 9 MG/ML
INJECTION, SOLUTION INTRAVENOUS CONTINUOUS PRN
Status: DISCONTINUED | OUTPATIENT
Start: 2024-02-09 | End: 2024-02-09

## 2024-02-09 RX ORDER — FENTANYL CITRATE 50 UG/ML
INJECTION, SOLUTION INTRAMUSCULAR; INTRAVENOUS
Status: DISCONTINUED | OUTPATIENT
Start: 2024-02-09 | End: 2024-02-09

## 2024-02-09 RX ORDER — ONDANSETRON HYDROCHLORIDE 2 MG/ML
INJECTION, SOLUTION INTRAVENOUS
Status: DISCONTINUED | OUTPATIENT
Start: 2024-02-09 | End: 2024-02-09

## 2024-02-09 RX ADMIN — FENTANYL CITRATE 50 MCG: 50 INJECTION INTRAMUSCULAR; INTRAVENOUS at 11:02

## 2024-02-09 RX ADMIN — LISINOPRIL 20 MG: 20 TABLET ORAL at 08:02

## 2024-02-09 RX ADMIN — PROPOFOL 50 MG: 10 INJECTION, EMULSION INTRAVENOUS at 11:02

## 2024-02-09 RX ADMIN — LIDOCAINE HYDROCHLORIDE 50 MG: 10 INJECTION, SOLUTION INTRAVENOUS at 11:02

## 2024-02-09 RX ADMIN — PIPERACILLIN SODIUM AND TAZOBACTAM SODIUM 4.5 G: 4; .5 INJECTION, POWDER, FOR SOLUTION INTRAVENOUS at 06:02

## 2024-02-09 RX ADMIN — MIDAZOLAM 2 MG: 1 INJECTION INTRAMUSCULAR; INTRAVENOUS at 11:02

## 2024-02-09 RX ADMIN — PIPERACILLIN SODIUM AND TAZOBACTAM SODIUM 4.5 G: 4; .5 INJECTION, POWDER, FOR SOLUTION INTRAVENOUS at 02:02

## 2024-02-09 RX ADMIN — PROPOFOL 100 MG: 10 INJECTION, EMULSION INTRAVENOUS at 11:02

## 2024-02-09 RX ADMIN — PIPERACILLIN SODIUM AND TAZOBACTAM SODIUM 4.5 G: 4; .5 INJECTION, POWDER, FOR SOLUTION INTRAVENOUS at 10:02

## 2024-02-09 RX ADMIN — TRAMADOL HYDROCHLORIDE 50 MG: 50 TABLET, COATED ORAL at 08:02

## 2024-02-09 RX ADMIN — ALLOPURINOL 300 MG: 300 TABLET ORAL at 08:02

## 2024-02-09 RX ADMIN — ATORVASTATIN CALCIUM 10 MG: 10 TABLET, FILM COATED ORAL at 08:02

## 2024-02-09 RX ADMIN — ONDANSETRON 4 MG: 2 INJECTION INTRAMUSCULAR; INTRAVENOUS at 11:02

## 2024-02-09 RX ADMIN — FENOFIBRATE 145 MG: 145 TABLET, FILM COATED ORAL at 08:02

## 2024-02-09 RX ADMIN — HYDROCHLOROTHIAZIDE 12.5 MG: 12.5 TABLET ORAL at 08:02

## 2024-02-09 RX ADMIN — SODIUM CHLORIDE: 0.9 INJECTION, SOLUTION INTRAVENOUS at 11:02

## 2024-02-09 RX ADMIN — METFORMIN HYDROCHLORIDE 500 MG: 500 TABLET, FILM COATED ORAL at 04:02

## 2024-02-09 NOTE — SUBJECTIVE & OBJECTIVE
Past Medical History:   Diagnosis Date    Diabetes mellitus     Gout, unspecified     Hypertension        No past surgical history on file.    Review of patient's allergies indicates:  No Known Allergies    No current facility-administered medications on file prior to encounter.     Current Outpatient Medications on File Prior to Encounter   Medication Sig    allopurinoL (ZYLOPRIM) 300 MG tablet Take 300 mg by mouth once daily.    atorvastatin (LIPITOR) 10 MG tablet Take 10 mg by mouth once daily.    fenofibrate (TRICOR) 145 MG tablet Take 145 mg by mouth every evening.    glimepiride (AMARYL) 4 MG tablet Take 8 mg by mouth once daily.    glipiZIDE (GLUCOTROL) 10 MG TR24 Take 10 mg by mouth every morning.    JANUVIA 100 mg Tab Take 100 mg by mouth once daily.    cetirizine (ZYRTEC) 10 MG tablet Take 1 tablet (10 mg total) by mouth once daily.    lisinopriL-hydrochlorothiazide (PRINZIDE,ZESTORETIC) 20-12.5 mg per tablet Take 1 tablet by mouth once daily.    metFORMIN (GLUCOPHAGE) 1000 MG tablet Take 1,000 mg by mouth 2 (two) times daily.    ondansetron (ZOFRAN) 4 MG tablet Take 1 tablet (4 mg total) by mouth every 6 (six) hours.     Family History    None       Tobacco Use    Smoking status: Not on file    Smokeless tobacco: Not on file   Substance and Sexual Activity    Alcohol use: Not on file    Drug use: Not on file    Sexual activity: Not on file     Review of Systems   Constitutional:  Positive for appetite change. Negative for activity change, chills and fever.   HENT:  Negative for ear pain, mouth sores, nosebleeds and sore throat.    Eyes:  Negative for visual disturbance.   Respiratory:  Negative for cough, shortness of breath and wheezing.    Cardiovascular:  Negative for chest pain, palpitations and leg swelling.   Gastrointestinal:  Negative for abdominal distention, abdominal pain, blood in stool, constipation, diarrhea, nausea and vomiting.   Endocrine: Negative for polyphagia.   Genitourinary:   Negative for difficulty urinating, dysuria, flank pain and frequency.   Musculoskeletal:  Positive for arthralgias. Negative for back pain and myalgias.   Skin:  Positive for wound. Negative for rash.   Neurological:  Negative for dizziness, tremors, seizures, syncope, facial asymmetry, speech difficulty, weakness and headaches.   Hematological:  Negative for adenopathy.   Psychiatric/Behavioral:  Negative for agitation, confusion and hallucinations. The patient is not nervous/anxious.      Objective:     Vital Signs (Most Recent):  Temp: 98.6 °F (37 °C) (02/09/24 0734)  Pulse: 89 (02/09/24 0734)  Resp: 19 (02/09/24 0734)  BP: 125/77 (02/09/24 0734)  SpO2: 99 % (02/09/24 0734) Vital Signs (24h Range):  Temp:  [98.3 °F (36.8 °C)-98.6 °F (37 °C)] 98.6 °F (37 °C)  Pulse:  [] 89  Resp:  [18-20] 19  SpO2:  [96 %-99 %] 99 %  BP: (115-139)/(74-83) 125/77     Weight: 125.9 kg (277 lb 9 oz)  Body mass index is 33.79 kg/m².     Physical Exam  Constitutional:       General: He is not in acute distress.     Appearance: Normal appearance. He is obese. He is not ill-appearing or toxic-appearing.   HENT:      Head: Normocephalic and atraumatic.      Nose: No congestion or rhinorrhea.      Mouth/Throat:      Dentition: Abnormal dentition. Dental caries present.      Pharynx: No oropharyngeal exudate.   Eyes:      General: No scleral icterus.  Cardiovascular:      Rate and Rhythm: Normal rate and regular rhythm.      Pulses: Normal pulses.      Heart sounds: Normal heart sounds. No murmur heard.     No friction rub. No gallop.   Pulmonary:      Effort: Pulmonary effort is normal. No respiratory distress.      Breath sounds: Normal breath sounds. No stridor. No wheezing, rhonchi or rales.   Chest:      Chest wall: No tenderness.   Abdominal:      General: Bowel sounds are normal. There is no distension.      Palpations: Abdomen is soft. There is no mass.      Tenderness: There is no abdominal tenderness. There is no right CVA  tenderness, left CVA tenderness, guarding or rebound.      Hernia: No hernia is present.   Musculoskeletal:         General: No swelling, tenderness or deformity.      Cervical back: Normal range of motion and neck supple. No rigidity.      Right lower leg: No edema.      Left lower leg: No edema.   Lymphadenopathy:      Cervical: No cervical adenopathy.   Skin:     General: Skin is warm and dry.      Capillary Refill: Capillary refill takes less than 2 seconds.      Coloration: Skin is not jaundiced or pale.      Comments: Wound to right great toe   Neurological:      General: No focal deficit present.      Mental Status: He is alert and oriented to person, place, and time.      Motor: No weakness.      Gait: Gait normal.   Psychiatric:         Mood and Affect: Mood normal.         Behavior: Behavior normal.         Thought Content: Thought content normal.         Judgment: Judgment normal.                Significant Labs: All pertinent labs within the past 24 hours have been reviewed.  CBC:   Recent Labs   Lab 02/09/24  0313   WBC 11.20   HGB 16.0   HCT 47.0        CMP:   Recent Labs   Lab 02/08/24  0928 02/09/24  0314    138   K 4.0 3.9    107   CO2 23 28   * 175*   BUN 20 23*   CREATININE 1.4 1.5*   CALCIUM 9.6 9.5   PROT 7.4 6.8   ALBUMIN 3.8 3.4*   BILITOT 0.3 0.3   ALKPHOS 62 62   AST 14 13   ALT 32 26   ANIONGAP 7 3       Significant Imaging: I have reviewed all pertinent imaging results/findings within the past 24 hours.

## 2024-02-09 NOTE — TRANSFER OF CARE
Anesthesia Transfer of Care Note    Patient: Jose Gil    Procedure(s) Performed: Procedure(s) (LRB):  AMPUTATION, TOE (Right)    Patient location: Other: OR    Anesthesia Type: MAC    Transport from OR: Transported from OR on room air with adequate spontaneous ventilation    Post pain: adequate analgesia    Post assessment: no apparent anesthetic complications    Post vital signs: stable    Level of consciousness: awake    Nausea/Vomiting: no nausea/vomiting    Complications: none    Transfer of care protocol was followed      Last vitals:   98% O2 SAT  104 HR  98/53  16 RR  37 C TEMP

## 2024-02-09 NOTE — OP NOTE
Surgeon:  JEVON Daigle DPM      Preoperative diagnosis:  DM ulceration right great toe with osteomyelitis     Postoperative diagnosis:  DM ulceration right great toe with osteomyelitis     Procedures performed:  Partial right great toe amputation     Anesthesia:  Local, 18 mL 0.5% Marcaine plain, with MAC     Materials: 2.0 vicryl, 3.0 vicryl and 3.0 nylon     Hemostasis:  Well-padded pneumatic ankle tourniquet set at 250 mmHg for 24 minutes     Findings:  Soft bone with yellow discoloration to base of distal phalanx and head of proximal phalanx, purulent drainage present.  Proximal aspect of proximal phalanx with healthy appearance, good bone density and integrity.     Estimated blood loss:  < 5 mL     Complications:  None     Indications for procedure:  The patient is a 44-year-old male known to me from outpatient setting with chronic right great toe ulceration at hallux IP joint, admitted to [facility] with underlying osteomyelitis and need for toe amputation.  He is tolerating IV antibiotic therapy well.  Planned procedure has been discussed in great detail with the patient and his wife, including possible risks and complications such as pain, bleeding, swelling, worsening of infection, need for additional surgical procedures and/or amputations.  All questions were answered.  Informed consent was obtained and placed in patient's chart.       Procedure in detail:  On 02/09/24, the patient was brought into the operating room via cart and placed on the operating table in a supine position.  After the administration of MAC anesthesia, 18ml 0.5% marcaine plain was used to anesthetize the 1st ray in field block fashion.  The right foot was then prepped and draped in the usual aseptic technique.  Time-out was then performed by the OR staff to ensure correct patient, procedure, limb designation.  At this time, anesthesia was checked and deemed adequate.       Attention was directed to the right hallux, racquet  type full-thickness incision was made overlying the proximal phalanx of the toe, encompassing both plantar and dorsal wounds, purulent drainage was present.  The distal phalanx was then freed from any soft tissues, and the toe disarticulated at the DIP joint, passed from the field to be sent as specimen - the base was yellow in color and soft.  Deep culture was taken at this time.  The proximal phalanx was then inspected and freed from its soft tissues, and the sagittal saw used to remove distal 3/4 of bone, leaving the base intact - this is to be sent as clearance fragment.  The surgical site was then copiously irrigated using 1 liter of normal saline.  2-0 and 3-0 Vicryl was used to reapproximate deep tissues.  3-0 nylon was then used to reapproximate skin edges in horizontal suture technique.  The tourniquet was then deflated, and prompt hyperemic response was noted to skin flaps.       The surgical site was then dressed with adaptic, 4 x 4 gauze, aniceto and Kerlix, secured with Ace wrap.  The patient tolerated both anesthesia and procedure well.  He was transported back to [facility] unit with vital signs stable and neurovascular status intact to amputation flaps. We will continue with IV antibiotics, following wound cultures closely.  He has been instructed to keep right foot elevated, bathroom privileges/heel touch weight-bearing in surgical shoe on this right foot.

## 2024-02-09 NOTE — ASSESSMENT & PLAN NOTE
Chronic, controlled. Latest blood pressure and vitals reviewed-     Temp:  [98.3 °F (36.8 °C)-98.6 °F (37 °C)]   Pulse:  []   Resp:  [18-19]   BP: (115-130)/(74-83)   SpO2:  [97 %-99 %] .   Home meds for hypertension were reviewed and noted below.   Hypertension Medications               lisinopriL-hydrochlorothiazide (PRINZIDE,ZESTORETIC) 20-12.5 mg per tablet Take 1 tablet by mouth once daily.            While in the hospital, will manage blood pressure as follows; Continue home antihypertensive regimen    Will utilize p.r.n. blood pressure medication only if patient's  systolic blood pressure greater than 175 and he develops symptoms such as worsening chest pain or shortness of breath.

## 2024-02-09 NOTE — BRIEF OP NOTE
[facility] Surg  Brief Operative Note    SUMMARY     Surgery Date: 2/9/2024     Surgeon(s) and Role:     * Franco Mallory, DPM - Primary    Assisting Surgeon: None    Pre-op Diagnosis:  Osteomyelitis of great toe of right foot [M86.9]    Post-op Diagnosis:  Post-Op Diagnosis Codes:     * Osteomyelitis of great toe of right foot [M86.9]    Procedure(s) (LRB):  AMPUTATION, TOE (Right)    Anesthesia: Local 18 ml 0.5% marcaine plain with MAC    Implants:  * No implants in log *    Operative Findings: Soft, yellow bone at site of ulceration with purulent drainage, no visible tophi.  Bone of proximal phalanx with much healthier appearance    Estimated Blood Loss: * No values recorded between 2/9/2024 11:26 AM and 2/9/2024 12:05 PM *    Estimated Blood Loss has not been documented. EBL = < 5ml.         Specimens:   Specimen (24h ago, onward)       Start     Ordered    02/09/24 1135  Specimen to Pathology, Surgery General Surgery  Once        Comments: Pre-op Diagnosis: Osteomyelitis of great toe of right foot [M86.9]Procedure(s):AMPUTATION, TOE Number of specimens: 2Name of specimens: 1. Right great toe @ 72916. Clearance fragment right great toe @1145     References:    Click here for ordering Quick Tip   Question Answer Comment   Procedure Type: General Surgery    Which provider would you like to cc? FRANCO MALLORY    Release to patient Immediate        02/09/24 1151                    [id number]

## 2024-02-09 NOTE — HOSPITAL COURSE
2/9 DL:  Patient doing well this morning.  He is sitting up in bed and is awake, alert, oriented.  Labs and vital signs stable.  Patient is scheduled to go to OR per Podiatry today.  Continue current IV antibiotic therapy.  Anticipate 1-2 days inpatient IV antibiotic therapy post procedure.    2/10 GT:  Patient went to the OR yesterday with Podiatry and had an amputation of his right great toe.  Deep cultures as well as tissue was sent for analysis, and those are pending.  Patient is to have bathroom privileges with heel-toe weight-bearing while in the surgical shoe.  Patient's metformin was restarted yesterday, blood sugars noted.  Patient's H&H is stable, he is afebrile, and he reports he is feeling well.  Patient reports relief of discomfort with p.r.n. pain medication.  Potential DC tomorrow.  Continue with podiatry recs.    2/11 ND pt feeling better - podiatry saw pt and he can be dc'd today with close fu in podiatry clinic

## 2024-02-09 NOTE — PLAN OF CARE
NPO at midnight. Patient denies pain or complaints.      Problem: Adult Inpatient Plan of Care  Goal: Plan of Care Review  Outcome: Ongoing, Progressing  Goal: Patient-Specific Goal (Individualized)  Outcome: Ongoing, Progressing  Goal: Absence of Hospital-Acquired Illness or Injury  Outcome: Ongoing, Progressing  Goal: Optimal Comfort and Wellbeing  Outcome: Ongoing, Progressing  Goal: Readiness for Transition of Care  Outcome: Ongoing, Progressing     Problem: Diabetes Comorbidity  Goal: Blood Glucose Level Within Targeted Range  Outcome: Ongoing, Progressing

## 2024-02-09 NOTE — ASSESSMENT & PLAN NOTE
X-ray right foot obtained demonstrates soft tissue abnormality along the medial D IP of the big toe with a 6.5 mm erosive area at the base of the distal phalanx of the big toe 6.5 mm could represent osteomyelitis or lesion from gout.  This is consistent with in office x-ray findings.  Podiatry is following, plans on right great toe amputation tomorrow.  Blood cultures are pending.  On Zosyn for antibiotic coverage.  NPO after midnight.     37.5

## 2024-02-09 NOTE — ANESTHESIA PREPROCEDURE EVALUATION
02/09/2024  Jose Gil is a 44 y.o., male.      Pre-op Assessment    I have reviewed the Patient Summary Reports.    I have reviewed the NPO Status.   I have reviewed the Medications.     Review of Systems  Anesthesia Hx:  No problems with previous Anesthesia             Denies Family Hx of Anesthesia complications.    Denies Personal Hx of Anesthesia complications.                    Social:  Smoker       Cardiovascular:     Hypertension, well controlled              ECG has been reviewed.                          Pulmonary:  Pulmonary Normal                       Renal/:  Renal/ Normal                 Hepatic/GI:  Hepatic/GI Normal                 Neurological:  Neurology Normal                                      Endocrine:  Diabetes, well controlled, type 2             Lab Results   Component Value Date    WBC 11.20 02/09/2024    HGB 16.0 02/09/2024    HCT 47.0 02/09/2024    MCV 90 02/09/2024     02/09/2024      CMP  Sodium   Date Value Ref Range Status   02/09/2024 138 136 - 145 mmol/L Final     Potassium   Date Value Ref Range Status   02/09/2024 3.9 3.5 - 5.1 mmol/L Final     Chloride   Date Value Ref Range Status   02/09/2024 107 95 - 110 mmol/L Final     CO2   Date Value Ref Range Status   02/09/2024 28 23 - 29 mmol/L Final     Glucose   Date Value Ref Range Status   02/09/2024 175 (H) 70 - 110 mg/dL Final     BUN   Date Value Ref Range Status   02/09/2024 23 (H) 6 - 20 mg/dL Final     Creatinine   Date Value Ref Range Status   02/09/2024 1.5 (H) 0.5 - 1.4 mg/dL Final     Calcium   Date Value Ref Range Status   02/09/2024 9.5 8.7 - 10.5 mg/dL Final     Total Protein   Date Value Ref Range Status   02/09/2024 6.8 6.0 - 8.4 g/dL Final     Albumin   Date Value Ref Range Status   02/09/2024 3.4 (L) 3.5 - 5.2 g/dL Final     Total Bilirubin   Date Value Ref Range Status   02/09/2024 0.3  0.1 - 1.0 mg/dL Final     Comment:     For infants and newborns, interpretation of results should be based  on gestational age, weight and in agreement with clinical  observations.    Premature Infant recommended reference ranges:  Up to 24 hours.............<8.0 mg/dL  Up to 48 hours............<12.0 mg/dL  3-5 days..................<15.0 mg/dL  6-29 days.................<15.0 mg/dL    For patients on Eltrombopag therapy, use of Dimension [place] TBIL is   not   recommended.       Alkaline Phosphatase   Date Value Ref Range Status   02/09/2024 62 55 - 135 U/L Final     AST   Date Value Ref Range Status   02/09/2024 13 10 - 40 U/L Final     ALT   Date Value Ref Range Status   02/09/2024 26 10 - 44 U/L Final     Anion Gap   Date Value Ref Range Status   02/09/2024 3 3 - 11 mmol/L Final     eGFR   Date Value Ref Range Status   02/09/2024 58.5 (A) >60 mL/min/1.73 m^2 Final        Physical Exam  General: Well nourished    Airway:  Mallampati: III / III  Mouth Opening: Normal  TM Distance: Normal  Tongue: Normal  Neck ROM: Normal ROM    Dental:  Periodontal disease    Chest/Lungs:  Clear to auscultation    Heart:  Rate: Normal  Rhythm: Regular Rhythm  Sounds: Normal        Anesthesia Plan  Type of Anesthesia, risks & benefits discussed:    Anesthesia Type: Gen Supraglottic Airway, MAC  Intra-op Monitoring Plan: Standard ASA Monitors  Post Op Pain Control Plan: multimodal analgesia  Induction:  IV  Airway Plan: Direct  Informed Consent: Informed consent signed with the Patient and all parties understand the risks and agree with anesthesia plan.  All questions answered.   ASA Score: 3  Day of Surgery Review of History & Physical: I have interviewed and examined the patient. I have reviewed the patient's H&P dated: There are no significant changes.     Ready For Surgery From Anesthesia Perspective.     .

## 2024-02-09 NOTE — CONSULTS
[facility]  Podiatry  Consult Note    Patient Name: Jose Gil  MRN: [id number]  Admission Date: 2/8/2024  Hospital Length of Stay: 0 days  Attending Physician: Maria Alejandra Lindquist MD  Primary Care Provider: Annabella Issa MD     Inpatient consult to Podiatry  Consult performed by: Franco Daigle DPM  Consult ordered by: Franco Daigle DPM  Reason for consult: right great toe wound  Assessment/Recommendations: Patient know to me from outpatient setting with chronic wound, underlying osteomyelitis. Plan for OR 2/9/24 for amputation.  Informed consent signed.  Patient NPO after midnight.        Subjective:     History of Present Illness:  Patient is a 44-year-old male, with past medical history significant for diabetes, hypertension, hyperlipidemia, gout, tobacco abuse, well known to me from outpatient setting due to chronic right great toe ulceration.  Patient has failed outpatient antibiotics, local wound care.  In office x-rays revealed bony destruction consistent with osteomyelitis at site of ulceration.  Treatment options have been discussed with patient, patient admitted to [facility] for IV antibiotic therapy as well as amputation of right great toe, partial 1st ray amputation.  Patient seen at bedside this evening, wife present, tolerating IV antibiotic therapy well.  He has minimal discomfort to right great toe secondary to neuropathy.  He denies any fever, chills or other systemic sign of infection.    Scheduled Meds:   [START ON 2/9/2024] allopurinoL  300 mg Oral Daily    atorvastatin  10 mg Oral QHS    fenofibrate  145 mg Oral QHS    [START ON 2/9/2024] hydroCHLOROthiazide  12.5 mg Oral Daily    [START ON 2/9/2024] lisinopriL  20 mg Oral Daily    piperacillin-tazobactam (Zosyn) IV (PEDS and ADULTS) (extended infusion is not appropriate)  4.5 g Intravenous Q8H     Continuous Infusions:  PRN Meds:acetaminophen, acetaminophen, dextrose 10%, dextrose 10%, glucagon (human  recombinant), glucose, glucose, HYDROcodone-acetaminophen, melatonin, ondansetron, sodium chloride 0.9%, traMADoL    Review of patient's allergies indicates:  No Known Allergies     Past Medical History:   Diagnosis Date    Diabetes mellitus     Gout, unspecified     Hypertension      No past surgical history on file.    Family History    None       Tobacco Use    Smoking status: Not on file    Smokeless tobacco: Not on file   Substance and Sexual Activity    Alcohol use: Not on file    Drug use: Not on file    Sexual activity: Not on file     Review of Systems   Constitutional:  Negative for chills and fever.   HENT:  Negative for ear pain.    Eyes:  Negative for visual disturbance.   Respiratory:  Negative for cough and shortness of breath.    Cardiovascular:  Negative for chest pain and leg swelling.   Gastrointestinal:  Negative for nausea and vomiting.   Musculoskeletal:  Positive for arthralgias.   Skin:  Positive for wound.   Neurological:  Negative for weakness.   Psychiatric/Behavioral:  Negative for confusion.      Objective:     Vital Signs (Most Recent):  Temp: 98.3 °F (36.8 °C) (02/08/24 2011)  Pulse: 100 (02/08/24 2011)  Resp: 18 (02/08/24 2011)  BP: 130/83 (02/08/24 2011)  SpO2: 97 % (02/08/24 2011) Vital Signs (24h Range):  Temp:  [98.3 °F (36.8 °C)] 98.3 °F (36.8 °C)  Pulse:  [100-106] 100  Resp:  [18-20] 18  SpO2:  [96 %-97 %] 97 %  BP: (130-139)/(81-83) 130/83     Weight: 125.9 kg (277 lb 9 oz)  Body mass index is 33.79 kg/m².    Foot Exam    General  General Appearance: appears stated age and healthy   Orientation: alert and oriented to person, place, and time   Affect: appropriate       Right Foot/Ankle     Inspection and Palpation  Ecchymosis: none  Tenderness: great toe interphalangeal joint   Swelling: great toe interphalangeal joint   Hammertoes: second toe, third toe, fourth toe and fifth toe  Hallux limitus: yes  Skin Exam: ulcer (0.5cm fibrogranular ulcer at hallux IP joint, probing to  underlying bone and to dorsal aspect, tenting skin, no active drainage today, erythema/hyperpigmentation surrounding wound extending to MTP joint) and erythema;     Neurovascular  Dorsalis pedis: 2+  Posterior tibial: 2+  Saphenous nerve sensation: diminished  Tibial nerve sensation: diminished  Superficial peroneal nerve sensation: diminished  Deep peroneal nerve sensation: diminished  Sural nerve sensation: diminished    Muscle Strength  Ankle dorsiflexion: 4  Ankle plantar flexion: 4  Ankle inversion: 4  Ankle eversion: 4      Left Foot/Ankle      Inspection and Palpation  Ecchymosis: none  Tenderness: none   Swelling: none   Arch: normal  Hammertoes: second toe, third toe, fourth toe and fifth toe  Claw toes: absent  Hallux valgus: no  Hallux limitus: no  Skin Exam: skin intact;     Neurovascular  Dorsalis pedis: 2+  Posterior tibial: 2+  Saphenous nerve sensation: diminished  Tibial nerve sensation: diminished  Superficial peroneal nerve sensation: diminished  Deep peroneal nerve sensation: diminished  Sural nerve sensation: diminished    Muscle Strength  Ankle dorsiflexion: 4  Ankle plantar flexion: 4  Ankle inversion: 4  Ankle eversion: 4          Laboratory:    CMP:   Recent Labs   Lab 02/08/24  0928   *   CALCIUM 9.6   ALBUMIN 3.8   PROT 7.4      K 4.0   CO2 23      BUN 20   CREATININE 1.4   ALKPHOS 62   ALT 32   AST 14   BILITOT 0.3     Coagulation:   Recent Labs   Lab 02/08/24  0928   INR 0.9   APTT 25.8     CRP:   Recent Labs   Lab 02/08/24  0928   CRP 0.72     ESR:   Recent Labs   Lab 02/08/24  0928   SEDRATE 3       Diagnostic Results:  X-Ray: I have reviewed all pertinent results/findings within the past 24 hours.  Right foot xray - Soft tissue abnormality along medial DIPJ joint of the big toe with erosive area at base of distal phalanx, could represent osteomyelitis or lesion from gout .  Chest xray - no acute lung disease        Assessment/Plan:     Active Diagnoses:    Diagnosis  Date Noted POA    PRINCIPAL PROBLEM:  Osteomyelitis of great toe of right foot [M86.9] 02/08/2024 Yes    Gout [M10.9] 02/08/2024 Yes    Hypertension [I10] 02/08/2024 Yes    Mixed hyperlipidemia [E78.2] 02/08/2024 Yes    Type 2 diabetes mellitus with other specified complication [E11.69] 10/20/2017 Yes      Problems Resolved During this Admission:       Osteomyelitis of great toe right foot  Bony erosion on x-ray consistent with osteo, as it is at site of chronic ulceration.  Patient has failed outpatient antibiotic therapy, local wound care.  Extensive discussion had with patient and his wife regarding benefit of amputation, plan for procedure under MAC with local sedation.  Planned procedure discussed in great detail, all questions were answered.  Informed consent signed and placed in patient's chart.  Patient is NPO after midnight, to OR on 02/09/2024.  Continue IV antibiotics at this time.    Type 2 diabetes, hyperlipidemia, hypertension, gout  All managed per primary service.    Thank you for your consult. I will follow-up with patient. Please contact us if you have any additional questions.    Franco Daigle, HAFSA  Podiatry  [facility]

## 2024-02-09 NOTE — PROGRESS NOTES
"[facility] Medicine  Progress Note    Patient Name: Jose Gil  MRN: [id number]  Patient Class: IP- Inpatient   Admission Date: 2/8/2024  Length of Stay: 1 days  Attending Physician: Maria Alejandra Lindquist MD  Primary Care Provider: Annabella Issa MD        Subjective:     Principal Problem:Osteomyelitis of great toe of right foot        HPI:  44-year-old  male with a past medical history of hypertension, hyperlipidemia, type 2 diabetes with diabetic neuropathy, gout obesity, tobacco abuse chronic ulcer of right great toe for 1 year directly admitted by Podiatry for anticipated right great toe amputation tomorrow.  Patient failed outpatient oral antibiotics as well as wound care.  In office x-rays demonstrated bony erosion consistent with osteomyelitis.  Chest x-ray obtained negative for any acute cardiopulmonary abnormalities.  X-ray of right foot demonstrates  Soft tissue abnormality along the medial DIP of the big toe with a 6.5 mm wrote a very at the bases distal phalanx of the big toe which could represent osteomyelitis or lesion from gout.  This appears to be consistent with the x-ray obtained in podiatry office. Patient reports he is feeling well other than some discomfort to his right foot, denies any recent fevers, cough, congestion, chest pain, dyspnea.  Patient is declining insulin per sliding scale due to being a , stating "once that is on my history I will never be able to get my license back."    Patient reports he does not check his blood sugars at home, since unsure his most recent levels.   Coags within acceptable limit, sed rate and CRP not elevated.      Overview/Hospital Course:  2/9 DL:  Patient doing well this morning.  He is sitting up in bed and is awake, alert, oriented.  Labs and vital signs stable.  Patient is scheduled to go to OR per Podiatry today.  Continue current IV antibiotic therapy.  Anticipate 1-2 days inpatient IV antibiotic " therapy post procedure.    Past Medical History:   Diagnosis Date    Diabetes mellitus     Gout, unspecified     Hypertension        No past surgical history on file.    Review of patient's allergies indicates:  No Known Allergies    No current facility-administered medications on file prior to encounter.     Current Outpatient Medications on File Prior to Encounter   Medication Sig    allopurinoL (ZYLOPRIM) 300 MG tablet Take 300 mg by mouth once daily.    atorvastatin (LIPITOR) 10 MG tablet Take 10 mg by mouth once daily.    fenofibrate (TRICOR) 145 MG tablet Take 145 mg by mouth every evening.    glimepiride (AMARYL) 4 MG tablet Take 8 mg by mouth once daily.    glipiZIDE (GLUCOTROL) 10 MG TR24 Take 10 mg by mouth every morning.    JANUVIA 100 mg Tab Take 100 mg by mouth once daily.    cetirizine (ZYRTEC) 10 MG tablet Take 1 tablet (10 mg total) by mouth once daily.    lisinopriL-hydrochlorothiazide (PRINZIDE,ZESTORETIC) 20-12.5 mg per tablet Take 1 tablet by mouth once daily.    metFORMIN (GLUCOPHAGE) 1000 MG tablet Take 1,000 mg by mouth 2 (two) times daily.    ondansetron (ZOFRAN) 4 MG tablet Take 1 tablet (4 mg total) by mouth every 6 (six) hours.     Family History    None       Tobacco Use    Smoking status: Not on file    Smokeless tobacco: Not on file   Substance and Sexual Activity    Alcohol use: Not on file    Drug use: Not on file    Sexual activity: Not on file     Review of Systems   Constitutional:  Positive for appetite change. Negative for activity change, chills and fever.   HENT:  Negative for ear pain, mouth sores, nosebleeds and sore throat.    Eyes:  Negative for visual disturbance.   Respiratory:  Negative for cough, shortness of breath and wheezing.    Cardiovascular:  Negative for chest pain, palpitations and leg swelling.   Gastrointestinal:  Negative for abdominal distention, abdominal pain, blood in stool, constipation, diarrhea, nausea and vomiting.   Endocrine: Negative for  polyphagia.   Genitourinary:  Negative for difficulty urinating, dysuria, flank pain and frequency.   Musculoskeletal:  Positive for arthralgias. Negative for back pain and myalgias.   Skin:  Positive for wound. Negative for rash.   Neurological:  Negative for dizziness, tremors, seizures, syncope, facial asymmetry, speech difficulty, weakness and headaches.   Hematological:  Negative for adenopathy.   Psychiatric/Behavioral:  Negative for agitation, confusion and hallucinations. The patient is not nervous/anxious.      Objective:     Vital Signs (Most Recent):  Temp: 98.6 °F (37 °C) (02/09/24 0734)  Pulse: 89 (02/09/24 0734)  Resp: 19 (02/09/24 0734)  BP: 125/77 (02/09/24 0734)  SpO2: 99 % (02/09/24 0734) Vital Signs (24h Range):  Temp:  [98.3 °F (36.8 °C)-98.6 °F (37 °C)] 98.6 °F (37 °C)  Pulse:  [] 89  Resp:  [18-20] 19  SpO2:  [96 %-99 %] 99 %  BP: (115-139)/(74-83) 125/77     Weight: 125.9 kg (277 lb 9 oz)  Body mass index is 33.79 kg/m².     Physical Exam  Constitutional:       General: He is not in acute distress.     Appearance: Normal appearance. He is obese. He is not ill-appearing or toxic-appearing.   HENT:      Head: Normocephalic and atraumatic.      Nose: No congestion or rhinorrhea.      Mouth/Throat:      Dentition: Abnormal dentition. Dental caries present.      Pharynx: No oropharyngeal exudate.   Eyes:      General: No scleral icterus.  Cardiovascular:      Rate and Rhythm: Normal rate and regular rhythm.      Pulses: Normal pulses.      Heart sounds: Normal heart sounds. No murmur heard.     No friction rub. No gallop.   Pulmonary:      Effort: Pulmonary effort is normal. No respiratory distress.      Breath sounds: Normal breath sounds. No stridor. No wheezing, rhonchi or rales.   Chest:      Chest wall: No tenderness.   Abdominal:      General: Bowel sounds are normal. There is no distension.      Palpations: Abdomen is soft. There is no mass.      Tenderness: There is no abdominal  tenderness. There is no right CVA tenderness, left CVA tenderness, guarding or rebound.      Hernia: No hernia is present.   Musculoskeletal:         General: No swelling, tenderness or deformity.      Cervical back: Normal range of motion and neck supple. No rigidity.      Right lower leg: No edema.      Left lower leg: No edema.   Lymphadenopathy:      Cervical: No cervical adenopathy.   Skin:     General: Skin is warm and dry.      Capillary Refill: Capillary refill takes less than 2 seconds.      Coloration: Skin is not jaundiced or pale.      Comments: Wound to right great toe   Neurological:      General: No focal deficit present.      Mental Status: He is alert and oriented to person, place, and time.      Motor: No weakness.      Gait: Gait normal.   Psychiatric:         Mood and Affect: Mood normal.         Behavior: Behavior normal.         Thought Content: Thought content normal.         Judgment: Judgment normal.                Significant Labs: All pertinent labs within the past 24 hours have been reviewed.  CBC:   Recent Labs   Lab 02/09/24  0313   WBC 11.20   HGB 16.0   HCT 47.0        CMP:   Recent Labs   Lab 02/08/24  0928 02/09/24  0314    138   K 4.0 3.9    107   CO2 23 28   * 175*   BUN 20 23*   CREATININE 1.4 1.5*   CALCIUM 9.6 9.5   PROT 7.4 6.8   ALBUMIN 3.8 3.4*   BILITOT 0.3 0.3   ALKPHOS 62 62   AST 14 13   ALT 32 26   ANIONGAP 7 3       Significant Imaging: I have reviewed all pertinent imaging results/findings within the past 24 hours.    Assessment/Plan:      * Osteomyelitis of great toe of right foot   X-ray right foot obtained demonstrates soft tissue abnormality along the medial D IP of the big toe with a 6.5 mm erosive area at the base of the distal phalanx of the big toe 6.5 mm could represent osteomyelitis or lesion from gout.  This is consistent with in office x-ray findings.  Podiatry is following, plans on right great toe amputation tomorrow.  Blood  cultures are pending.  On Zosyn for antibiotic coverage.  NPO after midnight.      Mixed hyperlipidemia   Resume fenofibrate and atorvastatin.      Type 2 diabetes mellitus with other specified complication  Patient is glipizide 10 mg daily, 1000 mg b.I.d. Farxiga daily.  Patient is refusing sliding scale insulin due to being fearful that he will not be able to obtain his license to continue to be a .      Hypertension  Chronic, controlled. Latest blood pressure and vitals reviewed-     Temp:  [98.3 °F (36.8 °C)-98.6 °F (37 °C)]   Pulse:  []   Resp:  [18-19]   BP: (115-130)/(74-83)   SpO2:  [97 %-99 %] .   Home meds for hypertension were reviewed and noted below.   Hypertension Medications               lisinopriL-hydrochlorothiazide (PRINZIDE,ZESTORETIC) 20-12.5 mg per tablet Take 1 tablet by mouth once daily.            While in the hospital, will manage blood pressure as follows; Continue home antihypertensive regimen    Will utilize p.r.n. blood pressure medication only if patient's  systolic blood pressure greater than 175 and he develops symptoms such as worsening chest pain or shortness of breath.    Gout  Continue home dose of allopurinol.        VTE Risk Mitigation (From admission, onward)           Ordered     Place sequential compression device  Until discontinued         02/08/24 0811     IP VTE LOW RISK PATIENT  Once         02/08/24 0811                    Discharge Planning   PIETRO:      Code Status: Full Code   Is the patient medically ready for discharge?:     Reason for patient still in hospital (select all that apply): Patient trending condition, Treatment, and Consult recommendations  Discharge Plan A: Home with family, Home Health                  Marta Ugarte NP  Department of Hospital Medicine   [facility]

## 2024-02-10 PROBLEM — S98.111A AMPUTATION OF RIGHT GREAT TOE: Status: ACTIVE | Noted: 2024-02-10

## 2024-02-10 LAB
ALBUMIN SERPL BCP-MCNC: 3.6 G/DL (ref 3.5–5.2)
ALP SERPL-CCNC: 54 U/L (ref 55–135)
ALT SERPL W/O P-5'-P-CCNC: 31 U/L (ref 10–44)
ANION GAP SERPL CALC-SCNC: 4 MMOL/L (ref 3–11)
AST SERPL-CCNC: 17 U/L (ref 10–40)
BASOPHILS # BLD AUTO: 0.12 K/UL (ref 0–0.2)
BASOPHILS NFR BLD: 1 % (ref 0–1.9)
BILIRUB SERPL-MCNC: 0.6 MG/DL (ref 0.1–1)
BUN SERPL-MCNC: 22 MG/DL (ref 6–20)
CALCIUM SERPL-MCNC: 9.7 MG/DL (ref 8.7–10.5)
CHLORIDE SERPL-SCNC: 105 MMOL/L (ref 95–110)
CO2 SERPL-SCNC: 27 MMOL/L (ref 23–29)
CREAT SERPL-MCNC: 1.5 MG/DL (ref 0.5–1.4)
DIFFERENTIAL METHOD BLD: ABNORMAL
EOSINOPHIL # BLD AUTO: 0.5 K/UL (ref 0–0.5)
EOSINOPHIL NFR BLD: 4.6 % (ref 0–8)
ERYTHROCYTE [DISTWIDTH] IN BLOOD BY AUTOMATED COUNT: 13.2 % (ref 11.5–14.5)
EST. GFR  (NO RACE VARIABLE): 58.5 ML/MIN/1.73 M^2
GLUCOSE SERPL-MCNC: 220 MG/DL (ref 70–110)
HCT VFR BLD AUTO: 47.4 % (ref 40–54)
HGB BLD-MCNC: 16.3 G/DL (ref 14–18)
IMM GRANULOCYTES # BLD AUTO: 0.09 K/UL (ref 0–0.04)
IMM GRANULOCYTES NFR BLD AUTO: 0.8 % (ref 0–0.5)
LYMPHOCYTES # BLD AUTO: 2.6 K/UL (ref 1–4.8)
LYMPHOCYTES NFR BLD: 22.5 % (ref 18–48)
MAGNESIUM SERPL-MCNC: 2.3 MG/DL (ref 1.6–2.6)
MCH RBC QN AUTO: 30.9 PG (ref 27–31)
MCHC RBC AUTO-ENTMCNC: 34.4 G/DL (ref 32–36)
MCV RBC AUTO: 90 FL (ref 82–98)
MONOCYTES # BLD AUTO: 1 K/UL (ref 0.3–1)
MONOCYTES NFR BLD: 8.8 % (ref 4–15)
NEUTROPHILS # BLD AUTO: 7.2 K/UL (ref 1.8–7.7)
NEUTROPHILS NFR BLD: 62.3 % (ref 38–73)
NRBC BLD-RTO: 0 /100 WBC
PLATELET # BLD AUTO: 237 K/UL (ref 150–450)
PMV BLD AUTO: 10.3 FL (ref 9.2–12.9)
POCT GLUCOSE: 143 MG/DL (ref 70–110)
POCT GLUCOSE: 204 MG/DL (ref 70–110)
POCT GLUCOSE: 241 MG/DL (ref 70–110)
POTASSIUM SERPL-SCNC: 4.3 MMOL/L (ref 3.5–5.1)
PROT SERPL-MCNC: 7.1 G/DL (ref 6–8.4)
RBC # BLD AUTO: 5.28 M/UL (ref 4.6–6.2)
SODIUM SERPL-SCNC: 136 MMOL/L (ref 136–145)
WBC # BLD AUTO: 11.56 K/UL (ref 3.9–12.7)

## 2024-02-10 PROCEDURE — 85025 COMPLETE CBC W/AUTO DIFF WBC: CPT

## 2024-02-10 PROCEDURE — 25000003 PHARM REV CODE 250: Performed by: INTERNAL MEDICINE

## 2024-02-10 PROCEDURE — 94799 UNLISTED PULMONARY SVC/PX: CPT | Mod: XB

## 2024-02-10 PROCEDURE — 36415 COLL VENOUS BLD VENIPUNCTURE: CPT

## 2024-02-10 PROCEDURE — 80053 COMPREHEN METABOLIC PANEL: CPT

## 2024-02-10 PROCEDURE — 99900035 HC TECH TIME PER 15 MIN (STAT)

## 2024-02-10 PROCEDURE — 83735 ASSAY OF MAGNESIUM: CPT

## 2024-02-10 PROCEDURE — 99900031 HC PATIENT EDUCATION (STAT)

## 2024-02-10 PROCEDURE — 63600175 PHARM REV CODE 636 W HCPCS: Performed by: PODIATRIST

## 2024-02-10 PROCEDURE — 11000001 HC ACUTE MED/SURG PRIVATE ROOM

## 2024-02-10 PROCEDURE — 25000003 PHARM REV CODE 250: Performed by: PODIATRIST

## 2024-02-10 PROCEDURE — 25000003 PHARM REV CODE 250

## 2024-02-10 RX ORDER — HYDROCODONE BITARTRATE AND ACETAMINOPHEN 5; 325 MG/1; MG/1
1 TABLET ORAL EVERY 6 HOURS PRN
Status: DISCONTINUED | OUTPATIENT
Start: 2024-02-10 | End: 2024-02-11 | Stop reason: HOSPADM

## 2024-02-10 RX ORDER — HYDROCODONE BITARTRATE AND ACETAMINOPHEN 10; 325 MG/1; MG/1
1 TABLET ORAL EVERY 6 HOURS PRN
Status: DISCONTINUED | OUTPATIENT
Start: 2024-02-10 | End: 2024-02-11 | Stop reason: HOSPADM

## 2024-02-10 RX ADMIN — HYDROCODONE BITARTRATE AND ACETAMINOPHEN 1 TABLET: 5; 325 TABLET ORAL at 04:02

## 2024-02-10 RX ADMIN — HYDROCODONE BITARTRATE AND ACETAMINOPHEN 1 TABLET: 10; 325 TABLET ORAL at 10:02

## 2024-02-10 RX ADMIN — PIPERACILLIN SODIUM AND TAZOBACTAM SODIUM 4.5 G: 4; .5 INJECTION, POWDER, FOR SOLUTION INTRAVENOUS at 02:02

## 2024-02-10 RX ADMIN — FENOFIBRATE 145 MG: 145 TABLET, FILM COATED ORAL at 09:02

## 2024-02-10 RX ADMIN — HYDROCODONE BITARTRATE AND ACETAMINOPHEN 1 TABLET: 10; 325 TABLET ORAL at 09:02

## 2024-02-10 RX ADMIN — ALLOPURINOL 300 MG: 300 TABLET ORAL at 08:02

## 2024-02-10 RX ADMIN — HYDROCODONE BITARTRATE AND ACETAMINOPHEN 1 TABLET: 10; 325 TABLET ORAL at 03:02

## 2024-02-10 RX ADMIN — ATORVASTATIN CALCIUM 10 MG: 10 TABLET, FILM COATED ORAL at 08:02

## 2024-02-10 RX ADMIN — HYDROCHLOROTHIAZIDE 12.5 MG: 12.5 TABLET ORAL at 08:02

## 2024-02-10 RX ADMIN — METFORMIN HYDROCHLORIDE 500 MG: 500 TABLET, FILM COATED ORAL at 05:02

## 2024-02-10 RX ADMIN — LISINOPRIL 20 MG: 20 TABLET ORAL at 08:02

## 2024-02-10 RX ADMIN — PIPERACILLIN SODIUM AND TAZOBACTAM SODIUM 4.5 G: 4; .5 INJECTION, POWDER, FOR SOLUTION INTRAVENOUS at 10:02

## 2024-02-10 RX ADMIN — PIPERACILLIN SODIUM AND TAZOBACTAM SODIUM 4.5 G: 4; .5 INJECTION, POWDER, FOR SOLUTION INTRAVENOUS at 06:02

## 2024-02-10 RX ADMIN — METFORMIN HYDROCHLORIDE 500 MG: 500 TABLET, FILM COATED ORAL at 08:02

## 2024-02-10 NOTE — ASSESSMENT & PLAN NOTE
Patient is glipizide 10 mg daily, 1000 mg b.I.d. Farxiga daily.  Patient is refusing sliding scale insulin due to being fearful that he will not be able to obtain his license to continue to be a .    2/10 GT:  Continue metformin at current dose.  Patient can resume his full dose as well as Farxiga on an outpatient basis.

## 2024-02-10 NOTE — PROGRESS NOTES
"[facility] Medicine  Progress Note    Patient Name: Jose Gil  MRN: [id number]  Patient Class: IP- Inpatient   Admission Date: 2/8/2024  Length of Stay: 2 days  Attending Physician: Maria Alejandra Lindquist MD  Primary Care Provider: Annabella Issa MD        Subjective:     Principal Problem:Osteomyelitis of great toe of right foot        HPI:  44-year-old  male with a past medical history of hypertension, hyperlipidemia, type 2 diabetes with diabetic neuropathy, gout obesity, tobacco abuse chronic ulcer of right great toe for 1 year directly admitted by Podiatry for anticipated right great toe amputation tomorrow.  Patient failed outpatient oral antibiotics as well as wound care.  In office x-rays demonstrated bony erosion consistent with osteomyelitis.  Chest x-ray obtained negative for any acute cardiopulmonary abnormalities.  X-ray of right foot demonstrates  Soft tissue abnormality along the medial DIP of the big toe with a 6.5 mm wrote a very at the bases distal phalanx of the big toe which could represent osteomyelitis or lesion from gout.  This appears to be consistent with the x-ray obtained in podiatry office. Patient reports he is feeling well other than some discomfort to his right foot, denies any recent fevers, cough, congestion, chest pain, dyspnea.  Patient is declining insulin per sliding scale due to being a , stating "once that is on my history I will never be able to get my license back."    Patient reports he does not check his blood sugars at home, since unsure his most recent levels.   Coags within acceptable limit, sed rate and CRP not elevated.      Overview/Hospital Course:  2/9 DL:  Patient doing well this morning.  He is sitting up in bed and is awake, alert, oriented.  Labs and vital signs stable.  Patient is scheduled to go to OR per Podiatry today.  Continue current IV antibiotic therapy.  Anticipate 1-2 days inpatient IV antibiotic " therapy post procedure.    2/10 GT:  Patient went to the OR yesterday with Podiatry and had an amputation of his right great toe.  Deep cultures as well as tissue was sent for analysis, and those are pending.  Patient is to have bathroom privileges with heel-toe weight-bearing while in the surgical shoe.  Patient's metformin was restarted yesterday, blood sugars noted.  Patient's H&H is stable, he is afebrile, and he reports he is feeling well.  Patient reports relief of discomfort with p.r.n. pain medication.  Potential DC tomorrow.  Continue with podiatry recs.    Past Medical History:   Diagnosis Date    Diabetes mellitus     Gout, unspecified     Hypertension        No past surgical history on file.    Review of patient's allergies indicates:  No Known Allergies    No current facility-administered medications on file prior to encounter.     Current Outpatient Medications on File Prior to Encounter   Medication Sig    allopurinoL (ZYLOPRIM) 300 MG tablet Take 300 mg by mouth once daily.    atorvastatin (LIPITOR) 10 MG tablet Take 10 mg by mouth once daily.    fenofibrate (TRICOR) 145 MG tablet Take 145 mg by mouth every evening.    glimepiride (AMARYL) 4 MG tablet Take 8 mg by mouth once daily.    glipiZIDE (GLUCOTROL) 10 MG TR24 Take 10 mg by mouth every morning.    JANUVIA 100 mg Tab Take 100 mg by mouth once daily.    cetirizine (ZYRTEC) 10 MG tablet Take 1 tablet (10 mg total) by mouth once daily.    lisinopriL-hydrochlorothiazide (PRINZIDE,ZESTORETIC) 20-12.5 mg per tablet Take 1 tablet by mouth once daily.    metFORMIN (GLUCOPHAGE) 1000 MG tablet Take 1,000 mg by mouth 2 (two) times daily.    ondansetron (ZOFRAN) 4 MG tablet Take 1 tablet (4 mg total) by mouth every 6 (six) hours.     Family History    None       Tobacco Use    Smoking status: Not on file    Smokeless tobacco: Not on file   Substance and Sexual Activity    Alcohol use: Not on file    Drug use: Not on file    Sexual activity: Not on file      Review of Systems   Constitutional:  Positive for appetite change. Negative for activity change, chills and fever.   HENT:  Negative for ear pain, mouth sores, nosebleeds and sore throat.    Eyes:  Negative for visual disturbance.   Respiratory:  Negative for cough, shortness of breath and wheezing.    Cardiovascular:  Negative for chest pain, palpitations and leg swelling.   Gastrointestinal:  Negative for abdominal distention, abdominal pain, blood in stool, constipation, diarrhea, nausea and vomiting.   Endocrine: Negative for polyphagia.   Genitourinary:  Negative for difficulty urinating, dysuria, flank pain and frequency.   Musculoskeletal:  Positive for arthralgias. Negative for back pain and myalgias.   Skin:  Positive for wound. Negative for rash.   Neurological:  Negative for dizziness, tremors, seizures, syncope, facial asymmetry, speech difficulty, weakness and headaches.   Hematological:  Negative for adenopathy.   Psychiatric/Behavioral:  Negative for agitation, confusion and hallucinations. The patient is not nervous/anxious.      Objective:     Vital Signs (Most Recent):  Temp: 98.3 °F (36.8 °C) (02/10/24 0745)  Pulse: 86 (02/10/24 0745)  Resp: 20 (02/10/24 0745)  BP: 107/70 (02/10/24 0803)  SpO2: 97 % (02/10/24 0745) Vital Signs (24h Range):  Temp:  [97.5 °F (36.4 °C)-98.3 °F (36.8 °C)] 98.3 °F (36.8 °C)  Pulse:  [] 86  Resp:  [16-20] 20  SpO2:  [96 %-98 %] 97 %  BP: (106-136)/(70-86) 107/70     Weight: 125.9 kg (277 lb 9 oz)  Body mass index is 33.79 kg/m².     Physical Exam  Constitutional:       General: He is not in acute distress.     Appearance: Normal appearance. He is obese. He is not ill-appearing or toxic-appearing.   HENT:      Head: Normocephalic and atraumatic.      Nose: No congestion or rhinorrhea.      Mouth/Throat:      Dentition: Abnormal dentition. Dental caries present.      Pharynx: No oropharyngeal exudate.   Eyes:      General: No scleral icterus.  Cardiovascular:       Rate and Rhythm: Normal rate and regular rhythm.      Pulses: Normal pulses.      Heart sounds: Normal heart sounds. No murmur heard.     No friction rub. No gallop.   Pulmonary:      Effort: Pulmonary effort is normal. No respiratory distress.      Breath sounds: Normal breath sounds. No stridor. No wheezing, rhonchi or rales.   Chest:      Chest wall: No tenderness.   Abdominal:      General: Bowel sounds are normal. There is no distension.      Palpations: Abdomen is soft. There is no mass.      Tenderness: There is no abdominal tenderness. There is no right CVA tenderness, left CVA tenderness, guarding or rebound.      Hernia: No hernia is present.   Musculoskeletal:         General: No swelling, tenderness or deformity.      Cervical back: Normal range of motion and neck supple. No rigidity.      Right lower leg: No edema.      Left lower leg: No edema.      Comments: Right foot surgical dressing noted to be intact s/p right great toe amputation   Lymphadenopathy:      Cervical: No cervical adenopathy.   Skin:     General: Skin is warm and dry.      Capillary Refill: Capillary refill takes less than 2 seconds.      Coloration: Skin is not jaundiced or pale.   Neurological:      General: No focal deficit present.      Mental Status: He is alert and oriented to person, place, and time.      Motor: No weakness.      Gait: Gait normal.   Psychiatric:         Mood and Affect: Mood normal.         Behavior: Behavior normal.         Thought Content: Thought content normal.         Judgment: Judgment normal.                Significant Labs: All pertinent labs within the past 24 hours have been reviewed.  CBC:   Recent Labs   Lab 02/09/24 0313 02/10/24  0559   WBC 11.20 11.56   HGB 16.0 16.3   HCT 47.0 47.4    237       CMP:   Recent Labs   Lab 02/08/24  0928 02/09/24  0314 02/10/24  0559    138 136   K 4.0 3.9 4.3    107 105   CO2 23 28 27   * 175* 220*   BUN 20 23* 22*   CREATININE 1.4  1.5* 1.5*   CALCIUM 9.6 9.5 9.7   PROT 7.4 6.8 7.1   ALBUMIN 3.8 3.4* 3.6   BILITOT 0.3 0.3 0.6   ALKPHOS 62 62 54*   AST 14 13 17   ALT 32 26 31   ANIONGAP 7 3 4         Significant Imaging: I have reviewed all pertinent imaging results/findings within the past 24 hours.    Assessment/Plan:      * Osteomyelitis of great toe of right foot   X-ray right foot obtained demonstrates soft tissue abnormality along the medial D IP of the big toe with a 6.5 mm erosive area at the base of the distal phalanx of the big toe 6.5 mm could represent osteomyelitis or lesion from gout.  This is consistent with in office x-ray findings.  Podiatry is following, plans on right great toe amputation tomorrow.  Blood cultures are pending.  On Zosyn for antibiotic coverage.  NPO after midnight.    2/10 GT:  Continue with current antibiotic regimen.  Patient had a right great toe amputation with Podiatry yesterday.  Tissue and deep cultures are pending.    Amputation of right great toe  Patient went to the OR on 2/9/24 with podiatry and had an amputation of the right great toe.  Deep cultures and tissues for cultures were sent and are pending.  Patient is heel touch weight-bearing with ortho shoe for bathroom privileges, and also needs to keep right lower extremity elevated.  Continue with podiatry recs.  Continue with current antibiotic regimen for treatment of osteomyelitis.      Mixed hyperlipidemia   Continue fenofibrate and atorvastatin.      Type 2 diabetes mellitus with other specified complication  Patient is glipizide 10 mg daily, 1000 mg b.I.d. Farxiga daily.  Patient is refusing sliding scale insulin due to being fearful that he will not be able to obtain his license to continue to be a .    2/10 GT:  Continue metformin at current dose.  Patient can resume his full dose as well as Farxiga on an outpatient basis.      Hypertension  Chronic, controlled. Latest blood pressure and vitals reviewed-     Temp:  [97.5 °F  (36.4 °C)-98.3 °F (36.8 °C)]   Pulse:  []   Resp:  [16-20]   BP: (106-136)/(70-86)   SpO2:  [96 %-98 %] .   Home meds for hypertension were reviewed and noted below.   Hypertension Medications               lisinopriL-hydrochlorothiazide (PRINZIDE,ZESTORETIC) 20-12.5 mg per tablet Take 1 tablet by mouth once daily.            While in the hospital, will manage blood pressure as follows; Continue home antihypertensive regimen    Will utilize p.r.n. blood pressure medication only if patient's  systolic blood pressure greater than 175 and he develops symptoms such as worsening chest pain or shortness of breath.    Gout  Continue home dose of allopurinol.        VTE Risk Mitigation (From admission, onward)           Ordered     Place sequential compression device  Until discontinued         02/08/24 0811     IP VTE LOW RISK PATIENT  Once         02/08/24 0811                    Discharge Planning   PIETRO:      Code Status: Full Code   Is the patient medically ready for discharge?:     Reason for patient still in hospital (select all that apply): Patient trending condition, Treatment, and Consult recommendations  Discharge Plan A: Home with family, Home Health                  JEREMIAH HARRIS  Department of Hospital Medicine   [facility] Surg

## 2024-02-10 NOTE — ASSESSMENT & PLAN NOTE
Patient went to the OR on 2/9/24 with podiatry and had an amputation of the right great toe.  Deep cultures and tissues for cultures were sent and are pending.  Patient is heel touch weight-bearing with ortho shoe for bathroom privileges, and also needs to keep right lower extremity elevated.  Continue with podiatry recs.  Continue with current antibiotic regimen for treatment of osteomyelitis.

## 2024-02-10 NOTE — ASSESSMENT & PLAN NOTE
X-ray right foot obtained demonstrates soft tissue abnormality along the medial D IP of the big toe with a 6.5 mm erosive area at the base of the distal phalanx of the big toe 6.5 mm could represent osteomyelitis or lesion from gout.  This is consistent with in office x-ray findings.  Podiatry is following, plans on right great toe amputation tomorrow.  Blood cultures are pending.  On Zosyn for antibiotic coverage.  NPO after midnight.    2/10 GT:  Continue with current antibiotic regimen.  Patient had a right great toe amputation with Podiatry yesterday.  Tissue and deep cultures are pending.

## 2024-02-10 NOTE — PROGRESS NOTES
[facility] Surg  Podiatry  Progress Note    Patient Name: Jose Gil  MRN: [id number]  Admission Date: 2/8/2024  Hospital Length of Stay: 2 days  Attending Physician: Maria Alejandra Lindquist MD  Primary Care Provider: Annabella Issa MD     Subjective:     Interval History: Patient seen at bedside POD #1 right great toe amputation, resting comfortably, better pain control with hydrocodone.  He denies any fever, chills, chest pain, shortness of breath.  Patient with good appetite, voiding without issue.    Follow-up For: Procedure(s) (LRB):  AMPUTATION, TOE (Right)    Post-Operative Day: 1 Day Post-Op    Scheduled Meds:   allopurinoL  300 mg Oral Daily    atorvastatin  10 mg Oral Daily    fenofibrate  145 mg Oral QHS    hydroCHLOROthiazide  12.5 mg Oral Daily    lisinopriL  20 mg Oral Daily    metFORMIN  500 mg Oral BID WM    piperacillin-tazobactam (Zosyn) IV (PEDS and ADULTS) (extended infusion is not appropriate)  4.5 g Intravenous Q8H     Continuous Infusions:  PRN Meds:acetaminophen, acetaminophen, dextrose 10%, dextrose 10%, glucagon (human recombinant), glucose, glucose, HYDROcodone-acetaminophen, HYDROcodone-acetaminophen, melatonin, ondansetron, sodium chloride 0.9%    Review of Systems  Objective:     Vital Signs (Most Recent):  Temp: 98.3 °F (36.8 °C) (02/10/24 0745)  Pulse: 86 (02/10/24 0745)  Resp: 20 (02/10/24 0945)  BP: 107/70 (02/10/24 0803)  SpO2: 97 % (02/10/24 0745) Vital Signs (24h Range):  Temp:  [97.5 °F (36.4 °C)-98.3 °F (36.8 °C)] 98.3 °F (36.8 °C)  Pulse:  [] 86  Resp:  [16-20] 20  SpO2:  [96 %-98 %] 97 %  BP: (106-136)/(70-86) 107/70     Weight: 125.9 kg (277 lb 9 oz)  Body mass index is 33.79 kg/m².    Foot Exam    General  General Appearance: appears stated age and healthy   Orientation: alert and oriented to person, place, and time   Affect: appropriate       Right Foot/Ankle     Comments  Bandage clean, dry, intact to right foot, no strike through noted.  Capillary refill  "time brisk to remaining 4 digits.  Patient without calf pain.          Laboratory:  CBC:   Recent Labs   Lab 02/10/24  0559   WBC 11.56   RBC 5.28   HGB 16.3   HCT 47.4      MCV 90   MCH 30.9   MCHC 34.4     CMP:   Recent Labs   Lab 02/10/24  0559   *   CALCIUM 9.7   ALBUMIN 3.6   PROT 7.1      K 4.3   CO2 27      BUN 22*   CREATININE 1.5*   ALKPHOS 54*   ALT 31   AST 17   BILITOT 0.6     Wound Cultures: No results for input(s): "LABAERO" in the last 4320 hours.  Microbiology Results (last 7 days)       Procedure Component Value Units Date/Time    Culture, Anaerobic [[id number]] Collected: 02/09/24 1130    Order Status: Sent Specimen: Wound from Toe, Right Foot Updated: 02/09/24 2023    Aerobic culture [[id number]] Collected: 02/09/24 1130    Order Status: Sent Specimen: Wound from Toe, Right Foot Updated: 02/09/24 2023    Blood culture (site 1) [[id number]] Collected: 02/08/24 0928    Order Status: Completed Specimen: Blood Updated: 02/09/24 2012     Blood Culture, Routine No Growth to date      No Growth to date    Narrative:      Site # 1, aerobic and anaerobic    Blood culture (site 2) [[id number]] Collected: 02/08/24 0938    Order Status: Completed Specimen: Blood Updated: 02/09/24 2012     Blood Culture, Routine No Growth to date      No Growth to date    Narrative:      Site # 2, aerobic only            Diagnostic Results:  None      Assessment/Plan:     Active Diagnoses:    Diagnosis Date Noted POA    PRINCIPAL PROBLEM:  Osteomyelitis of great toe of right foot [M86.9] 02/08/2024 Yes    Amputation of right great toe [S98.111A] 02/10/2024 No    Gout [M10.9] 02/08/2024 Yes    Hypertension [I10] 02/08/2024 Yes    Mixed hyperlipidemia [E78.2] 02/08/2024 Yes    Type 2 diabetes mellitus with other specified complication [E11.69] 10/20/2017 Yes      Problems Resolved During this Admission:       Osteomyelitis of great toe right foot  Postop day 1 from partial right great toe amputation.  " Bandage clean, dry, intact.  Plan for dressing change tomorrow.  Surgical shoe ordered, along with repeat x-rays.  Monitoring wound culture results  Continue IV antibiotics at this time.  Pain medication adjusted.     Type 2 diabetes, hyperlipidemia, hypertension, gout  All managed per primary service.    Franco Daigle DPM  Podiatry  [facility] S[facility]

## 2024-02-10 NOTE — PLAN OF CARE
Pt tolerated right great toe amputation today. Diet order advanced and tolerated. Antibiotics continue as ordered. Per pt stated no pain as of yet. Will continue to monitor.   Problem: Adult Inpatient Plan of Care  Goal: Plan of Care Review  Outcome: Ongoing, Progressing  Goal: Patient-Specific Goal (Individualized)  Outcome: Ongoing, Progressing  Goal: Absence of Hospital-Acquired Illness or Injury  Outcome: Ongoing, Progressing  Goal: Optimal Comfort and Wellbeing  Outcome: Ongoing, Progressing  Goal: Readiness for Transition of Care  Outcome: Ongoing, Progressing     Problem: Diabetes Comorbidity  Goal: Blood Glucose Level Within Targeted Range  Outcome: Ongoing, Progressing

## 2024-02-10 NOTE — SUBJECTIVE & OBJECTIVE
Past Medical History:   Diagnosis Date    Diabetes mellitus     Gout, unspecified     Hypertension        No past surgical history on file.    Review of patient's allergies indicates:  No Known Allergies    No current facility-administered medications on file prior to encounter.     Current Outpatient Medications on File Prior to Encounter   Medication Sig    allopurinoL (ZYLOPRIM) 300 MG tablet Take 300 mg by mouth once daily.    atorvastatin (LIPITOR) 10 MG tablet Take 10 mg by mouth once daily.    fenofibrate (TRICOR) 145 MG tablet Take 145 mg by mouth every evening.    glimepiride (AMARYL) 4 MG tablet Take 8 mg by mouth once daily.    glipiZIDE (GLUCOTROL) 10 MG TR24 Take 10 mg by mouth every morning.    JANUVIA 100 mg Tab Take 100 mg by mouth once daily.    cetirizine (ZYRTEC) 10 MG tablet Take 1 tablet (10 mg total) by mouth once daily.    lisinopriL-hydrochlorothiazide (PRINZIDE,ZESTORETIC) 20-12.5 mg per tablet Take 1 tablet by mouth once daily.    metFORMIN (GLUCOPHAGE) 1000 MG tablet Take 1,000 mg by mouth 2 (two) times daily.    ondansetron (ZOFRAN) 4 MG tablet Take 1 tablet (4 mg total) by mouth every 6 (six) hours.     Family History    None       Tobacco Use    Smoking status: Not on file    Smokeless tobacco: Not on file   Substance and Sexual Activity    Alcohol use: Not on file    Drug use: Not on file    Sexual activity: Not on file     Review of Systems   Constitutional:  Positive for appetite change. Negative for activity change, chills and fever.   HENT:  Negative for ear pain, mouth sores, nosebleeds and sore throat.    Eyes:  Negative for visual disturbance.   Respiratory:  Negative for cough, shortness of breath and wheezing.    Cardiovascular:  Negative for chest pain, palpitations and leg swelling.   Gastrointestinal:  Negative for abdominal distention, abdominal pain, blood in stool, constipation, diarrhea, nausea and vomiting.   Endocrine: Negative for polyphagia.   Genitourinary:   Negative for difficulty urinating, dysuria, flank pain and frequency.   Musculoskeletal:  Positive for arthralgias. Negative for back pain and myalgias.   Skin:  Positive for wound. Negative for rash.   Neurological:  Negative for dizziness, tremors, seizures, syncope, facial asymmetry, speech difficulty, weakness and headaches.   Hematological:  Negative for adenopathy.   Psychiatric/Behavioral:  Negative for agitation, confusion and hallucinations. The patient is not nervous/anxious.      Objective:     Vital Signs (Most Recent):  Temp: 98.3 °F (36.8 °C) (02/10/24 0745)  Pulse: 86 (02/10/24 0745)  Resp: 20 (02/10/24 0745)  BP: 107/70 (02/10/24 0803)  SpO2: 97 % (02/10/24 0745) Vital Signs (24h Range):  Temp:  [97.5 °F (36.4 °C)-98.3 °F (36.8 °C)] 98.3 °F (36.8 °C)  Pulse:  [] 86  Resp:  [16-20] 20  SpO2:  [96 %-98 %] 97 %  BP: (106-136)/(70-86) 107/70     Weight: 125.9 kg (277 lb 9 oz)  Body mass index is 33.79 kg/m².     Physical Exam  Constitutional:       General: He is not in acute distress.     Appearance: Normal appearance. He is obese. He is not ill-appearing or toxic-appearing.   HENT:      Head: Normocephalic and atraumatic.      Nose: No congestion or rhinorrhea.      Mouth/Throat:      Dentition: Abnormal dentition. Dental caries present.      Pharynx: No oropharyngeal exudate.   Eyes:      General: No scleral icterus.  Cardiovascular:      Rate and Rhythm: Normal rate and regular rhythm.      Pulses: Normal pulses.      Heart sounds: Normal heart sounds. No murmur heard.     No friction rub. No gallop.   Pulmonary:      Effort: Pulmonary effort is normal. No respiratory distress.      Breath sounds: Normal breath sounds. No stridor. No wheezing, rhonchi or rales.   Chest:      Chest wall: No tenderness.   Abdominal:      General: Bowel sounds are normal. There is no distension.      Palpations: Abdomen is soft. There is no mass.      Tenderness: There is no abdominal tenderness. There is no right  CVA tenderness, left CVA tenderness, guarding or rebound.      Hernia: No hernia is present.   Musculoskeletal:         General: No swelling, tenderness or deformity.      Cervical back: Normal range of motion and neck supple. No rigidity.      Right lower leg: No edema.      Left lower leg: No edema.      Comments: Right foot surgical dressing noted to be intact s/p right great toe amputation   Lymphadenopathy:      Cervical: No cervical adenopathy.   Skin:     General: Skin is warm and dry.      Capillary Refill: Capillary refill takes less than 2 seconds.      Coloration: Skin is not jaundiced or pale.   Neurological:      General: No focal deficit present.      Mental Status: He is alert and oriented to person, place, and time.      Motor: No weakness.      Gait: Gait normal.   Psychiatric:         Mood and Affect: Mood normal.         Behavior: Behavior normal.         Thought Content: Thought content normal.         Judgment: Judgment normal.                Significant Labs: All pertinent labs within the past 24 hours have been reviewed.  CBC:   Recent Labs   Lab 02/09/24  0313 02/10/24  0559   WBC 11.20 11.56   HGB 16.0 16.3   HCT 47.0 47.4    237       CMP:   Recent Labs   Lab 02/08/24  0928 02/09/24  0314 02/10/24  0559    138 136   K 4.0 3.9 4.3    107 105   CO2 23 28 27   * 175* 220*   BUN 20 23* 22*   CREATININE 1.4 1.5* 1.5*   CALCIUM 9.6 9.5 9.7   PROT 7.4 6.8 7.1   ALBUMIN 3.8 3.4* 3.6   BILITOT 0.3 0.3 0.6   ALKPHOS 62 62 54*   AST 14 13 17   ALT 32 26 31   ANIONGAP 7 3 4         Significant Imaging: I have reviewed all pertinent imaging results/findings within the past 24 hours.

## 2024-02-10 NOTE — PLAN OF CARE
Problem: Adult Inpatient Plan of Care  Goal: Optimal Comfort and Wellbeing  Outcome: Ongoing, Not Progressing         Problem: Adult Inpatient Plan of Care  Goal: Plan of Care Review  Outcome: Ongoing, Progressing  Goal: Patient-Specific Goal (Individualized)  Outcome: Ongoing, Progressing  Goal: Absence of Hospital-Acquired Illness or Injury  Outcome: Ongoing, Progressing  Goal: Readiness for Transition of Care  Outcome: Ongoing, Progressing     Problem: Diabetes Comorbidity  Goal: Blood Glucose Level Within Targeted Range  Outcome: Ongoing, Progressing     Problem: Pain Acute  Goal: Acceptable Pain Control and Functional Ability  Outcome: Ongoing, Progressing

## 2024-02-10 NOTE — ASSESSMENT & PLAN NOTE
Chronic, controlled. Latest blood pressure and vitals reviewed-     Temp:  [97.5 °F (36.4 °C)-98.3 °F (36.8 °C)]   Pulse:  []   Resp:  [16-20]   BP: (106-136)/(70-86)   SpO2:  [96 %-98 %] .   Home meds for hypertension were reviewed and noted below.   Hypertension Medications               lisinopriL-hydrochlorothiazide (PRINZIDE,ZESTORETIC) 20-12.5 mg per tablet Take 1 tablet by mouth once daily.            While in the hospital, will manage blood pressure as follows; Continue home antihypertensive regimen    Will utilize p.r.n. blood pressure medication only if patient's  systolic blood pressure greater than 175 and he develops symptoms such as worsening chest pain or shortness of breath.

## 2024-02-11 VITALS
TEMPERATURE: 98 F | HEIGHT: 76 IN | OXYGEN SATURATION: 98 % | SYSTOLIC BLOOD PRESSURE: 118 MMHG | RESPIRATION RATE: 20 BRPM | BODY MASS INDEX: 33.8 KG/M2 | WEIGHT: 277.56 LBS | DIASTOLIC BLOOD PRESSURE: 77 MMHG | HEART RATE: 83 BPM

## 2024-02-11 LAB
ALBUMIN SERPL BCP-MCNC: 3.3 G/DL (ref 3.5–5.2)
ALP SERPL-CCNC: 60 U/L (ref 55–135)
ALT SERPL W/O P-5'-P-CCNC: 28 U/L (ref 10–44)
ANION GAP SERPL CALC-SCNC: 6 MMOL/L (ref 3–11)
AST SERPL-CCNC: 19 U/L (ref 10–40)
BASOPHILS # BLD AUTO: 0.13 K/UL (ref 0–0.2)
BASOPHILS NFR BLD: 1.2 % (ref 0–1.9)
BILIRUB SERPL-MCNC: 0.4 MG/DL (ref 0.1–1)
BUN SERPL-MCNC: 26 MG/DL (ref 6–20)
CALCIUM SERPL-MCNC: 9.4 MG/DL (ref 8.7–10.5)
CHLORIDE SERPL-SCNC: 107 MMOL/L (ref 95–110)
CO2 SERPL-SCNC: 25 MMOL/L (ref 23–29)
CREAT SERPL-MCNC: 1.7 MG/DL (ref 0.5–1.4)
DIFFERENTIAL METHOD BLD: ABNORMAL
EOSINOPHIL # BLD AUTO: 0.6 K/UL (ref 0–0.5)
EOSINOPHIL NFR BLD: 5.4 % (ref 0–8)
ERYTHROCYTE [DISTWIDTH] IN BLOOD BY AUTOMATED COUNT: 13.1 % (ref 11.5–14.5)
EST. GFR  (NO RACE VARIABLE): 50.3 ML/MIN/1.73 M^2
GLUCOSE SERPL-MCNC: 279 MG/DL (ref 70–110)
HCT VFR BLD AUTO: 45.4 % (ref 40–54)
HGB BLD-MCNC: 15.8 G/DL (ref 14–18)
IMM GRANULOCYTES # BLD AUTO: 0.07 K/UL (ref 0–0.04)
IMM GRANULOCYTES NFR BLD AUTO: 0.7 % (ref 0–0.5)
LYMPHOCYTES # BLD AUTO: 3.7 K/UL (ref 1–4.8)
LYMPHOCYTES NFR BLD: 34.9 % (ref 18–48)
MAGNESIUM SERPL-MCNC: 2.3 MG/DL (ref 1.6–2.6)
MCH RBC QN AUTO: 31.2 PG (ref 27–31)
MCHC RBC AUTO-ENTMCNC: 34.8 G/DL (ref 32–36)
MCV RBC AUTO: 90 FL (ref 82–98)
MONOCYTES # BLD AUTO: 1 K/UL (ref 0.3–1)
MONOCYTES NFR BLD: 9.4 % (ref 4–15)
NEUTROPHILS # BLD AUTO: 5.1 K/UL (ref 1.8–7.7)
NEUTROPHILS NFR BLD: 48.4 % (ref 38–73)
NRBC BLD-RTO: 0 /100 WBC
PLATELET # BLD AUTO: 262 K/UL (ref 150–450)
PMV BLD AUTO: 10.8 FL (ref 9.2–12.9)
POTASSIUM SERPL-SCNC: 3.9 MMOL/L (ref 3.5–5.1)
PROT SERPL-MCNC: 7 G/DL (ref 6–8.4)
RBC # BLD AUTO: 5.06 M/UL (ref 4.6–6.2)
SODIUM SERPL-SCNC: 138 MMOL/L (ref 136–145)
WBC # BLD AUTO: 10.46 K/UL (ref 3.9–12.7)

## 2024-02-11 PROCEDURE — 99900031 HC PATIENT EDUCATION (STAT)

## 2024-02-11 PROCEDURE — 25000003 PHARM REV CODE 250: Performed by: INTERNAL MEDICINE

## 2024-02-11 PROCEDURE — 36415 COLL VENOUS BLD VENIPUNCTURE: CPT

## 2024-02-11 PROCEDURE — 85025 COMPLETE CBC W/AUTO DIFF WBC: CPT

## 2024-02-11 PROCEDURE — 25000003 PHARM REV CODE 250

## 2024-02-11 PROCEDURE — 80053 COMPREHEN METABOLIC PANEL: CPT

## 2024-02-11 PROCEDURE — 63600175 PHARM REV CODE 636 W HCPCS: Performed by: PODIATRIST

## 2024-02-11 PROCEDURE — 94761 N-INVAS EAR/PLS OXIMETRY MLT: CPT

## 2024-02-11 PROCEDURE — 25000003 PHARM REV CODE 250: Performed by: PODIATRIST

## 2024-02-11 PROCEDURE — 99900035 HC TECH TIME PER 15 MIN (STAT)

## 2024-02-11 PROCEDURE — 83735 ASSAY OF MAGNESIUM: CPT

## 2024-02-11 RX ORDER — METFORMIN HYDROCHLORIDE 1000 MG/1
1000 TABLET ORAL 2 TIMES DAILY
Start: 2024-02-11

## 2024-02-11 RX ORDER — ACETAMINOPHEN 325 MG/1
650 TABLET ORAL EVERY 6 HOURS PRN
Refills: 0
Start: 2024-02-11

## 2024-02-11 RX ORDER — AMOXICILLIN AND CLAVULANATE POTASSIUM 875; 125 MG/1; MG/1
1 TABLET, FILM COATED ORAL EVERY 12 HOURS
Qty: 14 TABLET | Refills: 0 | Status: SHIPPED | OUTPATIENT
Start: 2024-02-11 | End: 2024-02-18

## 2024-02-11 RX ORDER — HYDROCODONE BITARTRATE AND ACETAMINOPHEN 10; 325 MG/1; MG/1
1 TABLET ORAL EVERY 6 HOURS PRN
Qty: 15 TABLET | Refills: 0 | Status: SHIPPED | OUTPATIENT
Start: 2024-02-11

## 2024-02-11 RX ORDER — AMOXICILLIN AND CLAVULANATE POTASSIUM 875; 125 MG/1; MG/1
1 TABLET, FILM COATED ORAL EVERY 12 HOURS
Qty: 15 TABLET | Refills: 0 | Status: CANCELLED | OUTPATIENT
Start: 2024-02-11

## 2024-02-11 RX ORDER — HYDROCODONE BITARTRATE AND ACETAMINOPHEN 10; 325 MG/1; MG/1
1 TABLET ORAL EVERY 6 HOURS PRN
Qty: 15 TABLET | Refills: 0 | Status: CANCELLED | OUTPATIENT
Start: 2024-02-11

## 2024-02-11 RX ADMIN — PIPERACILLIN SODIUM AND TAZOBACTAM SODIUM 4.5 G: 4; .5 INJECTION, POWDER, FOR SOLUTION INTRAVENOUS at 06:02

## 2024-02-11 RX ADMIN — METFORMIN HYDROCHLORIDE 500 MG: 500 TABLET, FILM COATED ORAL at 08:02

## 2024-02-11 RX ADMIN — ALLOPURINOL 300 MG: 300 TABLET ORAL at 08:02

## 2024-02-11 RX ADMIN — HYDROCODONE BITARTRATE AND ACETAMINOPHEN 1 TABLET: 10; 325 TABLET ORAL at 06:02

## 2024-02-11 RX ADMIN — HYDROCHLOROTHIAZIDE 12.5 MG: 12.5 TABLET ORAL at 08:02

## 2024-02-11 RX ADMIN — ATORVASTATIN CALCIUM 10 MG: 10 TABLET, FILM COATED ORAL at 08:02

## 2024-02-11 RX ADMIN — LISINOPRIL 20 MG: 20 TABLET ORAL at 08:02

## 2024-02-11 NOTE — PLAN OF CARE
Plan of care reviewed and ongoing with patient and spouse at the bedside. 20 gauge to L FA saline locked, received IV antibiotics during the night, room air tolerating well, ambulating independently to BR. PRN medications given for pain with moderate relief. Call light and personal items within reach.       Problem: Adult Inpatient Plan of Care  Goal: Plan of Care Review  Outcome: Ongoing, Progressing  Goal: Absence of Hospital-Acquired Illness or Injury  Outcome: Ongoing, Progressing  Goal: Optimal Comfort and Wellbeing  Outcome: Ongoing, Progressing  Goal: Readiness for Transition of Care  Outcome: Ongoing, Progressing     Problem: Diabetes Comorbidity  Goal: Blood Glucose Level Within Targeted Range  Outcome: Ongoing, Progressing     Problem: Pain Acute  Goal: Acceptable Pain Control and Functional Ability  Outcome: Ongoing, Progressing     Problem: Adult Inpatient Plan of Care  Goal: Patient-Specific Goal (Individualized)  Outcome: Ongoing, Not Progressing

## 2024-02-11 NOTE — ASSESSMENT & PLAN NOTE
X-ray right foot obtained demonstrates soft tissue abnormality along the medial D IP of the big toe with a 6.5 mm erosive area at the base of the distal phalanx of the big toe 6.5 mm could represent osteomyelitis or lesion from gout.  This is consistent with in office x-ray findings.  Podiatry is following, plans on right great toe amputation tomorrow.  Blood cultures are pending.  On Zosyn for antibiotic coverage.  NPO after midnight.    2/10 GT:  Continue with current antibiotic regimen.  Patient had a right great toe amputation with Podiatry yesterday.  Tissue and deep cultures are pending.    2/11 sp amputation of right great toe - dc home with podiatry fu with augmentin bid

## 2024-02-11 NOTE — ASSESSMENT & PLAN NOTE
Patient went to the OR on 2/9/24 with podiatry and had an amputation of the right great toe.  Deep cultures and tissues for cultures were sent and are pending.  Patient is heel touch weight-bearing with ortho shoe for bathroom privileges, and also needs to keep right lower extremity elevated.  Continue with podiatry recs.  Continue with current antibiotic regimen for treatment of osteomyelitis.    2/11 podiatry fu

## 2024-02-11 NOTE — PROGRESS NOTES
[facility] Surg  Podiatry  Progress Note    Patient Name: Jose Gil  MRN: [id number]  Admission Date: 2/8/2024  Hospital Length of Stay: 3 days  Attending Physician: Maria Alejandra Lindquist MD  Primary Care Provider: Annabella Issa MD     Subjective:     Interval History: Patient seen at bedside POD #2 partial right great toe amputation, resting comfortably, better pain control with hydrocodone.  He denies any fever, chills, chest pain, shortness of breath.  He denies any acute overnight events. Patient with good appetite, voiding without issue.  Surgical shoe at bedside.    Follow-up For: Procedure(s) (LRB):  AMPUTATION, TOE (Right)    Post-Operative Day: 2 Days Post-Op    Scheduled Meds:   allopurinoL  300 mg Oral Daily    atorvastatin  10 mg Oral Daily    fenofibrate  145 mg Oral QHS    hydroCHLOROthiazide  12.5 mg Oral Daily    lisinopriL  20 mg Oral Daily    metFORMIN  500 mg Oral BID WM    piperacillin-tazobactam (Zosyn) IV (PEDS and ADULTS) (extended infusion is not appropriate)  4.5 g Intravenous Q8H     Continuous Infusions:  PRN Meds:acetaminophen, acetaminophen, dextrose 10%, dextrose 10%, glucagon (human recombinant), glucose, glucose, HYDROcodone-acetaminophen, HYDROcodone-acetaminophen, melatonin, ondansetron, sodium chloride 0.9%    Review of Systems  Constitutional:  Negative for chills and fever.   HENT:  Negative for ear pain.    Eyes:  Negative for visual disturbance.   Respiratory:  Negative for cough and shortness of breath.    Cardiovascular:  Negative for chest pain and leg swelling.   Gastrointestinal:  Negative for nausea and vomiting.   Musculoskeletal:  Positive for arthralgias.   Skin:  Positive for wound.   Neurological:  Negative for weakness.   Psychiatric/Behavioral:  Negative for confusion.      Objective:     Vital Signs (Most Recent):  Temp: 97.7 °F (36.5 °C) (02/11/24 0815)  Pulse: 83 (02/11/24 0902)  Resp: 20 (02/11/24 0815)  BP: 118/77 (02/11/24 0816)  SpO2: 98 %  "(02/11/24 0902) Vital Signs (24h Range):  Temp:  [97.5 °F (36.4 °C)-98.5 °F (36.9 °C)] 97.7 °F (36.5 °C)  Pulse:  [] 83  Resp:  [18-20] 20  SpO2:  [95 %-98 %] 98 %  BP: (111-133)/(65-80) 118/77     Weight: 125.9 kg (277 lb 9 oz)  Body mass index is 33.79 kg/m².    Foot Exam    General  General Appearance: appears stated age and healthy   Orientation: alert and oriented to person, place, and time   Affect: appropriate         Right Foot/Ankle      Inspection and Palpation  Tenderness: surgical site   Swelling: surgical site  Hammertoes: second toe, third toe, fourth toe and fifth toe  Hallux limitus: yes  Skin Exam: incision site of partial right great toe amputation well coapted, sutures intact, minimal hemorrhagic drainage to bandage, no active bleeding, minimal surrounding erythema, no purulence      Neurovascular  Dorsalis pedis: 2+  Posterior tibial: 2+  CFT to lesser digits and amputation flaps brisk  Saphenous nerve sensation: diminished  Tibial nerve sensation: diminished  Superficial peroneal nerve sensation: diminished  Deep peroneal nerve sensation: diminished  Sural nerve sensation: diminished     Muscle Strength  Ankle dorsiflexion: 4  Ankle plantar flexion: 4  Ankle inversion: 4  Ankle eversion: 4        Left Foot/Ankle       Inspection and Palpation  Ecchymosis: none  Tenderness: none   Swelling: none   Arch: normal  Hammertoes: second toe, third toe, fourth toe and fifth toe  Claw toes: absent  Hallux valgus: no  Hallux limitus: no  Skin Exam: skin intact;      Neurovascular  Dorsalis pedis: 2+  Posterior tibial: 2+  Saphenous nerve sensation: diminished  Tibial nerve sensation: diminished  Superficial peroneal nerve sensation: diminished  Deep peroneal nerve sensation: diminished  Sural nerve sensation: diminished     Muscle Strength  Ankle dorsiflexion: 4  Ankle plantar flexion: 4  Ankle inversion: 4  Ankle eversion: 4    Laboratory:  Blood Cultures: No results for input(s): "LABBLOO" in the " "last 48 hours.  CBC:   Recent Labs   Lab 02/11/24  0540   WBC 10.46   RBC 5.06   HGB 15.8   HCT 45.4      MCV 90   MCH 31.2*   MCHC 34.8     CMP:   Recent Labs   Lab 02/11/24  0540   *   CALCIUM 9.4   ALBUMIN 3.3*   PROT 7.0      K 3.9   CO2 25      BUN 26*   CREATININE 1.7*   ALKPHOS 60   ALT 28   AST 19   BILITOT 0.4     Wound Cultures: No results for input(s): "LABAERO" in the last 4320 hours.    Diagnostic Results:  X-Ray: I have reviewed all pertinent results/findings within the past 24 hours.  Interval partial amptutation of great toe.    Assessment/Plan:     Active Diagnoses:    Diagnosis Date Noted POA    PRINCIPAL PROBLEM:  Osteomyelitis of great toe of right foot [M86.9] 02/08/2024 Yes    Amputation of right great toe [S98.111A] 02/10/2024 No    Gout [M10.9] 02/08/2024 Yes    Hypertension [I10] 02/08/2024 Yes    Mixed hyperlipidemia [E78.2] 02/08/2024 Yes    Type 2 diabetes mellitus with other specified complication [E11.69] 10/20/2017 Yes      Problems Resolved During this Admission:       Osteomyelitis of great toe right foot  Postop day 2 from partial right great toe amputation, dressing changed, incision site well coapted.  New dry, sterile dressing applied.  Patient instructed to keep bandage clean, dry, intact until follow-up visit Wednesday, 2/14/24.  He is to contact clinic for appointment time.  Patient with heel touch weight-bearing in surgical shoe, elevation of right foot while at rest optimize wound healing.  He will be discharged with oral Augmentin, antibiotics will be adjusted according to wound culture results.      Type 2 diabetes, hyperlipidemia, hypertension, gout  Continue home meds and f/u with PCP.    Franco Daigle DPM  Podiatry  [facility] Surg    "

## 2024-02-11 NOTE — ASSESSMENT & PLAN NOTE
Chronic, controlled. Latest blood pressure and vitals reviewed-     Temp:  [97.5 °F (36.4 °C)-98.5 °F (36.9 °C)]   Pulse:  []   Resp:  [18-20]   BP: (111-133)/(65-80)   SpO2:  [95 %-98 %] .   Home meds for hypertension were reviewed and noted below.   Hypertension Medications               lisinopriL-hydrochlorothiazide (PRINZIDE,ZESTORETIC) 20-12.5 mg per tablet Take 1 tablet by mouth once daily.            While in the hospital, will manage blood pressure as follows; Continue home antihypertensive regimen    Will utilize p.r.n. blood pressure medication only if patient's  systolic blood pressure greater than 175 and he develops symptoms such as worsening chest pain or shortness of breath.

## 2024-02-11 NOTE — DISCHARGE SUMMARY
"[facility] Medicine  Discharge Summary      Patient Name: Jose Gil  MRN: [id number]  KAYLYN: [id number]  Patient Class: IP- Inpatient  Admission Date: 2/8/2024  Hospital Length of Stay: 3 days  Discharge Date and Time:  02/11/2024 10:06 AM  Attending Physician: Maria Alejandra Lindquist MD   Discharging Provider: Maria Alejandra Lindquist MD  Primary Care Provider: Annabella Issa MD    Primary Care Team: Networked reference to record PCT     HPI:   44-year-old  male with a past medical history of hypertension, hyperlipidemia, type 2 diabetes with diabetic neuropathy, gout obesity, tobacco abuse chronic ulcer of right great toe for 1 year directly admitted by Podiatry for anticipated right great toe amputation tomorrow.  Patient failed outpatient oral antibiotics as well as wound care.  In office x-rays demonstrated bony erosion consistent with osteomyelitis.  Chest x-ray obtained negative for any acute cardiopulmonary abnormalities.  X-ray of right foot demonstrates  Soft tissue abnormality along the medial DIP of the big toe with a 6.5 mm wrote a very at the bases distal phalanx of the big toe which could represent osteomyelitis or lesion from gout.  This appears to be consistent with the x-ray obtained in podiatry office. Patient reports he is feeling well other than some discomfort to his right foot, denies any recent fevers, cough, congestion, chest pain, dyspnea.  Patient is declining insulin per sliding scale due to being a , stating "once that is on my history I will never be able to get my license back."    Patient reports he does not check his blood sugars at home, since unsure his most recent levels.   Coags within acceptable limit, sed rate and CRP not elevated.      Procedure(s) (LRB):  AMPUTATION, TOE (Right)      Hospital Course:   2/9 DL:  Patient doing well this morning.  He is sitting up in bed and is awake, alert, oriented.  Labs and vital signs stable.  " Patient is scheduled to go to OR per Podiatry today.  Continue current IV antibiotic therapy.  Anticipate 1-2 days inpatient IV antibiotic therapy post procedure.    2/10 GT:  Patient went to the OR yesterday with Podiatry and had an amputation of his right great toe.  Deep cultures as well as tissue was sent for analysis, and those are pending.  Patient is to have bathroom privileges with heel-toe weight-bearing while in the surgical shoe.  Patient's metformin was restarted yesterday, blood sugars noted.  Patient's H&H is stable, he is afebrile, and he reports he is feeling well.  Patient reports relief of discomfort with p.r.n. pain medication.  Potential DC tomorrow.  Continue with podiatry recs.    2/11 ND pt feeling better - podiatry saw pt and he can be dc'd today with close fu in podiatry clinic     Goals of Care Treatment Preferences:  Code Status: Full Code      Consults:   Consults (From admission, onward)          Status Ordering Provider     Inpatient consult to Podiatry  Once        Provider:  Franco Mallory, DPM    Completed FRANCO MALLORY            Cardiac/Vascular  Mixed hyperlipidemia   Continue fenofibrate and atorvastatin.      Hypertension  Chronic, controlled. Latest blood pressure and vitals reviewed-     Temp:  [97.5 °F (36.4 °C)-98.5 °F (36.9 °C)]   Pulse:  []   Resp:  [18-20]   BP: (111-133)/(65-80)   SpO2:  [95 %-98 %] .   Home meds for hypertension were reviewed and noted below.   Hypertension Medications               lisinopriL-hydrochlorothiazide (PRINZIDE,ZESTORETIC) 20-12.5 mg per tablet Take 1 tablet by mouth once daily.            While in the hospital, will manage blood pressure as follows; Continue home antihypertensive regimen    Will utilize p.r.n. blood pressure medication only if patient's  systolic blood pressure greater than 175 and he develops symptoms such as worsening chest pain or shortness of breath.    ID  * Osteomyelitis of great toe of right foot    X-ray right foot obtained demonstrates soft tissue abnormality along the medial D IP of the big toe with a 6.5 mm erosive area at the base of the distal phalanx of the big toe 6.5 mm could represent osteomyelitis or lesion from gout.  This is consistent with in office x-ray findings.  Podiatry is following, plans on right great toe amputation tomorrow.  Blood cultures are pending.  On Zosyn for antibiotic coverage.  NPO after midnight.    2/10 GT:  Continue with current antibiotic regimen.  Patient had a right great toe amputation with Podiatry yesterday.  Tissue and deep cultures are pending.    2/11 sp amputation of right great toe - dc home with podiatry fu with augmentin bid    Endocrine  Type 2 diabetes mellitus with other specified complication  Patient is glipizide 10 mg daily, 1000 mg b.I.d. Farxiga daily.  Patient is refusing sliding scale insulin due to being fearful that he will not be able to obtain his license to continue to be a .    2/10 GT:  Continue metformin at current dose.  Patient can resume his full dose as well as Farxiga on an outpatient basis.    2/11 hold metformin for 2 days, good fluid intake - then resume metformin; cont other dm meds      Orthopedic  Amputation of right great toe  Patient went to the OR on 2/9/24 with podiatry and had an amputation of the right great toe.  Deep cultures and tissues for cultures were sent and are pending.  Patient is heel touch weight-bearing with ortho shoe for bathroom privileges, and also needs to keep right lower extremity elevated.  Continue with podiatry recs.  Continue with current antibiotic regimen for treatment of osteomyelitis.    2/11 podiatry fu    Gout  Continue home dose of allopurinol.        Final Active Diagnoses:    Diagnosis Date Noted POA    PRINCIPAL PROBLEM:  Osteomyelitis of great toe of right foot [M86.9] 02/08/2024 Yes    Amputation of right great toe [S98.111A] 02/10/2024 No    Gout [M10.9] 02/08/2024 Yes     Hypertension [I10] 02/08/2024 Yes    Mixed hyperlipidemia [E78.2] 02/08/2024 Yes    Type 2 diabetes mellitus with other specified complication [E11.69] 10/20/2017 Yes      Problems Resolved During this Admission:       Discharged Condition: stable    Disposition:     Follow Up:   Follow-up Information       Annabella Issa MD Follow up in 1 week(s).    Specialty: Internal Medicine  Contact information:  [facility]  [address]  [place] [address]  [phone]               Franco Castro DPM Follow up.    Specialty: Podiatry  Why: as scheduled by Dr castro  Contact information:  [facility]  [address]  [place] [id number] [phone]               Franco Castro DPM .    Specialty: Podiatry  Contact information:  [address] [place] [address]  [address]  [facility] [place] [id number] [phone]                           Patient Instructions:   No discharge procedures on file.    Significant Diagnostic Studies: Labs: CMP   Recent Labs   Lab 02/10/24  0559 02/11/24  0540    138   K 4.3 3.9    107   CO2 27 25   * 279*   BUN 22* 26*   CREATININE 1.5* 1.7*   CALCIUM 9.7 9.4   PROT 7.1 7.0   ALBUMIN 3.6 3.3*   BILITOT 0.6 0.4   ALKPHOS 54* 60   AST 17 19   ALT 31 28   ANIONGAP 4 6   , CBC   Recent Labs   Lab 02/10/24  0559 02/11/24  0540   WBC 11.56 10.46   HGB 16.3 15.8   HCT 47.4 45.4    262   , and All labs within the past 24 hours have been reviewed    Pending Diagnostic Studies:       Procedure Component Value Units Date/Time    Specimen to Pathology, Surgery General Surgery [[id number]] Collected: 02/09/24 1145    Order Status: Sent Lab Status: In process Updated: 02/09/24 1226    Specimen: Tissue            Medications:  Reconciled Home Medications:      Medication List        ASK your doctor about these medications      allopurinoL 300 MG tablet  Commonly known as: ZYLOPRIM  Take 300 mg by mouth once daily.     atorvastatin 10 MG tablet  Commonly known as:  LIPITOR  Take 10 mg by mouth once daily.     cetirizine 10 MG tablet  Commonly known as: ZYRTEC  Take 1 tablet (10 mg total) by mouth once daily.     fenofibrate 145 MG tablet  Commonly known as: TRICOR  Take 145 mg by mouth every evening.     glimepiride 4 MG tablet  Commonly known as: AMARYL  Take 8 mg by mouth once daily.     glipiZIDE 10 MG Tr24  Commonly known as: GLUCOTROL  Take 10 mg by mouth every morning.     JANUVIA 100 MG Tab  Generic drug: SITagliptin phosphate  Take 100 mg by mouth once daily.     lisinopriL-hydrochlorothiazide 20-12.5 mg per tablet  Commonly known as: PRINZIDE,ZESTORETIC  Take 1 tablet by mouth once daily.     metFORMIN 1000 MG tablet  Commonly known as: GLUCOPHAGE  Take 1,000 mg by mouth 2 (two) times daily.     ondansetron 4 MG tablet  Commonly known as: ZOFRAN  Take 1 tablet (4 mg total) by mouth every 6 (six) hours.              Indwelling Lines/Drains at time of discharge:   Lines/Drains/Airways       None                   Time spent on the discharge of patient: 35 minutes         Maria Alejandra Lindquist MD  Department of Hospital Medicine  [facility] Surg

## 2024-02-11 NOTE — DISCHARGE INSTRUCTIONS
Good fluid intake    Hold metformin for 2 days then resume medication  Resume other home medication    Encourgaed good control of dm

## 2024-02-12 LAB — BACTERIA SPEC AEROBE CULT: ABNORMAL

## 2024-02-12 NOTE — ANESTHESIA POSTPROCEDURE EVALUATION
Anesthesia Post Evaluation    Patient: Jose Gil    Procedure(s) Performed: Procedure(s) (LRB):  AMPUTATION, TOE (Right)    Final Anesthesia Type: MAC      Patient location during evaluation: med/surg floor  Patient participation: Yes- Able to Participate  Level of consciousness: awake  Post-procedure vital signs: reviewed and stable  Pain management: adequate  Airway patency: patent    PONV status at discharge: No PONV  Anesthetic complications: no      Cardiovascular status: blood pressure returned to baseline  Respiratory status: spontaneous ventilation  Hydration status: euvolemic  Follow-up not needed.              Vitals Value Taken Time   /77 02/11/24 0816   Temp 36.5 °C (97.7 °F) 02/11/24 0815   Pulse 83 02/11/24 0902   Resp 20 02/11/24 0815   SpO2 98 % 02/11/24 0902         No case tracking events are documented in the log.      Pain/Adebayo Score: Pain Rating Prior to Med Admin: 7 (2/11/2024  6:11 AM)  Pain Rating Post Med Admin: 5 (2/11/2024  7:11 AM)

## 2024-02-13 LAB
BACTERIA BLD CULT: NORMAL
BACTERIA BLD CULT: NORMAL

## 2024-02-14 LAB — BACTERIA SPEC ANAEROBE CULT: NORMAL

## 2024-02-16 LAB — SPECIMEN TO PATHOLOGY - SURGICAL: NORMAL

## 2024-08-14 ENCOUNTER — LAB VISIT (OUTPATIENT)
Dept: LAB | Facility: HOSPITAL | Age: 44
End: 2024-08-14
Attending: INTERNAL MEDICINE
Payer: COMMERCIAL

## 2024-08-14 DIAGNOSIS — Z11.4 ENCOUNTER FOR SCREENING FOR HIV: ICD-10-CM

## 2024-08-14 DIAGNOSIS — Z13.29 SCREENING FOR THYROID DISORDER: ICD-10-CM

## 2024-08-14 DIAGNOSIS — Z13.1 DIABETES MELLITUS SCREENING: ICD-10-CM

## 2024-08-14 DIAGNOSIS — Z13.0 SCREENING FOR DEFICIENCY ANEMIA: ICD-10-CM

## 2024-08-14 DIAGNOSIS — Z13.220 LIPID SCREENING: ICD-10-CM

## 2024-08-14 DIAGNOSIS — Z11.59 NEED FOR HEPATITIS C SCREENING TEST: ICD-10-CM

## 2024-08-14 DIAGNOSIS — Z00.00 WELLNESS EXAMINATION: ICD-10-CM

## 2024-08-14 LAB
ALBUMIN SERPL BCP-MCNC: 3.9 G/DL (ref 3.5–5.2)
ALBUMIN/CREAT UR: 43.6 UG/MG (ref 0–30)
ALP SERPL-CCNC: 65 U/L (ref 55–135)
ALT SERPL W/O P-5'-P-CCNC: 43 U/L (ref 10–44)
ANION GAP SERPL CALC-SCNC: 11 MMOL/L (ref 3–11)
AST SERPL-CCNC: 33 U/L (ref 10–40)
BASOPHILS # BLD AUTO: 0.08 K/UL (ref 0–0.2)
BASOPHILS NFR BLD: 1 % (ref 0–1.9)
BILIRUB SERPL-MCNC: 0.4 MG/DL (ref 0.1–1)
BUN SERPL-MCNC: 31 MG/DL (ref 6–20)
CALCIUM SERPL-MCNC: 9.8 MG/DL (ref 8.7–10.5)
CHLORIDE SERPL-SCNC: 102 MMOL/L (ref 95–110)
CHOLEST SERPL-MCNC: 102 MG/DL (ref 120–199)
CHOLEST/HDLC SERPL: 3.8 {RATIO} (ref 2–5)
CO2 SERPL-SCNC: 26 MMOL/L (ref 23–29)
CREAT SERPL-MCNC: 1.3 MG/DL (ref 0.5–1.4)
CREAT UR-MCNC: 83.1 MG/DL (ref 23–375)
DIFFERENTIAL METHOD BLD: ABNORMAL
EOSINOPHIL # BLD AUTO: 0.3 K/UL (ref 0–0.5)
EOSINOPHIL NFR BLD: 3.9 % (ref 0–8)
ERYTHROCYTE [DISTWIDTH] IN BLOOD BY AUTOMATED COUNT: 13.5 % (ref 11.5–14.5)
EST. GFR  (NO RACE VARIABLE): >60 ML/MIN/1.73 M^2
GLUCOSE SERPL-MCNC: 162 MG/DL (ref 70–110)
HCT VFR BLD AUTO: 45.7 % (ref 40–54)
HCV AB SERPL QL IA: NORMAL
HDLC SERPL-MCNC: 27 MG/DL (ref 40–75)
HDLC SERPL: 26.5 % (ref 20–50)
HGB BLD-MCNC: 16.1 G/DL (ref 14–18)
HIV 1+2 AB+HIV1 P24 AG SERPL QL IA: NORMAL
IMM GRANULOCYTES # BLD AUTO: 0.05 K/UL (ref 0–0.04)
IMM GRANULOCYTES NFR BLD AUTO: 0.6 % (ref 0–0.5)
LDLC SERPL CALC-MCNC: 7.2 MG/DL (ref 63–159)
LYMPHOCYTES # BLD AUTO: 2.6 K/UL (ref 1–4.8)
LYMPHOCYTES NFR BLD: 32.4 % (ref 18–48)
MCH RBC QN AUTO: 31.2 PG (ref 27–31)
MCHC RBC AUTO-ENTMCNC: 35.2 G/DL (ref 32–36)
MCV RBC AUTO: 89 FL (ref 82–98)
MICROALBUMIN UR DL<=1MG/L-MCNC: 36.2 MG/L
MONOCYTES # BLD AUTO: 0.7 K/UL (ref 0.3–1)
MONOCYTES NFR BLD: 9 % (ref 4–15)
NEUTROPHILS # BLD AUTO: 4.2 K/UL (ref 1.8–7.7)
NEUTROPHILS NFR BLD: 53.1 % (ref 38–73)
NONHDLC SERPL-MCNC: 75 MG/DL
NRBC BLD-RTO: 0 /100 WBC
PLATELET # BLD AUTO: 257 K/UL (ref 150–450)
PMV BLD AUTO: 10.9 FL (ref 9.2–12.9)
POTASSIUM SERPL-SCNC: 4.4 MMOL/L (ref 3.5–5.1)
PROT SERPL-MCNC: 7.4 G/DL (ref 6–8.4)
RBC # BLD AUTO: 5.16 M/UL (ref 4.6–6.2)
SODIUM SERPL-SCNC: 139 MMOL/L (ref 136–145)
TRIGL SERPL-MCNC: 339 MG/DL (ref 30–150)
TSH SERPL DL<=0.005 MIU/L-ACNC: 0.93 UIU/ML (ref 0.4–4)
WBC # BLD AUTO: 7.9 K/UL (ref 3.9–12.7)

## 2024-08-14 PROCEDURE — 82570 ASSAY OF URINE CREATININE: CPT | Performed by: INTERNAL MEDICINE

## 2024-08-14 PROCEDURE — 86803 HEPATITIS C AB TEST: CPT | Performed by: INTERNAL MEDICINE

## 2024-08-14 PROCEDURE — 80061 LIPID PANEL: CPT | Performed by: INTERNAL MEDICINE

## 2024-08-14 PROCEDURE — 87389 HIV-1 AG W/HIV-1&-2 AB AG IA: CPT | Performed by: INTERNAL MEDICINE

## 2024-08-14 PROCEDURE — 36415 COLL VENOUS BLD VENIPUNCTURE: CPT | Performed by: INTERNAL MEDICINE

## 2024-08-14 PROCEDURE — 84443 ASSAY THYROID STIM HORMONE: CPT | Performed by: INTERNAL MEDICINE

## 2024-08-14 PROCEDURE — 80053 COMPREHEN METABOLIC PANEL: CPT | Performed by: INTERNAL MEDICINE

## 2024-08-14 PROCEDURE — 85025 COMPLETE CBC W/AUTO DIFF WBC: CPT | Performed by: INTERNAL MEDICINE

## 2024-10-21 ENCOUNTER — PATIENT MESSAGE (OUTPATIENT)
Dept: FAMILY MEDICINE | Facility: CLINIC | Age: 44
End: 2024-10-21
Payer: COMMERCIAL

## 2025-07-21 ENCOUNTER — LAB VISIT (OUTPATIENT)
Dept: LAB | Facility: HOSPITAL | Age: 45
End: 2025-07-21
Attending: INTERNAL MEDICINE
Payer: COMMERCIAL

## 2025-07-21 DIAGNOSIS — Z00.00 WELLNESS EXAMINATION: ICD-10-CM

## 2025-07-21 DIAGNOSIS — Z13.220 LIPID SCREENING: ICD-10-CM

## 2025-07-21 DIAGNOSIS — Z13.29 SCREENING FOR THYROID DISORDER: ICD-10-CM

## 2025-07-21 DIAGNOSIS — Z13.0 SCREENING FOR DEFICIENCY ANEMIA: ICD-10-CM

## 2025-07-21 LAB
ABSOLUTE EOSINOPHIL (OHS): 0.32 K/UL
ABSOLUTE MONOCYTE (OHS): 0.85 K/UL (ref 0.3–1)
ABSOLUTE NEUTROPHIL COUNT (OHS): 6.8 K/UL (ref 1.8–7.7)
ALBUMIN SERPL BCP-MCNC: 4.6 G/DL (ref 3.5–5.2)
ALBUMIN/CREAT UR: 188.8 UG/MG
ALP SERPL-CCNC: 52 UNIT/L (ref 40–150)
ALT SERPL W/O P-5'-P-CCNC: 26 UNIT/L (ref 10–44)
ANION GAP (OHS): 11 MMOL/L (ref 8–16)
AST SERPL-CCNC: 23 UNIT/L (ref 11–45)
BASOPHILS # BLD AUTO: 0.08 K/UL
BASOPHILS NFR BLD AUTO: 0.7 %
BILIRUB SERPL-MCNC: 0.7 MG/DL (ref 0.1–1)
BUN SERPL-MCNC: 19 MG/DL (ref 6–20)
CALCIUM SERPL-MCNC: 10.4 MG/DL (ref 8.7–10.5)
CHLORIDE SERPL-SCNC: 105 MMOL/L (ref 95–110)
CHOLEST SERPL-MCNC: 133 MG/DL (ref 120–199)
CHOLEST/HDLC SERPL: 3.2 {RATIO} (ref 2–5)
CO2 SERPL-SCNC: 25 MMOL/L (ref 23–29)
CREAT SERPL-MCNC: 1.2 MG/DL (ref 0.5–1.4)
CREAT UR-MCNC: 170 MG/DL (ref 23–375)
ERYTHROCYTE [DISTWIDTH] IN BLOOD BY AUTOMATED COUNT: 13.1 % (ref 11.5–14.5)
GFR SERPLBLD CREATININE-BSD FMLA CKD-EPI: >60 ML/MIN/1.73/M2
GLUCOSE SERPL-MCNC: 142 MG/DL (ref 70–110)
HCT VFR BLD AUTO: 48.8 % (ref 40–54)
HDLC SERPL-MCNC: 41 MG/DL (ref 40–75)
HDLC SERPL: 30.8 % (ref 20–50)
HGB BLD-MCNC: 17.3 GM/DL (ref 14–18)
IMM GRANULOCYTES # BLD AUTO: 0.11 K/UL (ref 0–0.04)
IMM GRANULOCYTES NFR BLD AUTO: 1 % (ref 0–0.5)
LDLC SERPL CALC-MCNC: 72 MG/DL (ref 63–159)
LYMPHOCYTES # BLD AUTO: 2.56 K/UL (ref 1–4.8)
MCH RBC QN AUTO: 31.4 PG (ref 27–31)
MCHC RBC AUTO-ENTMCNC: 35.5 G/DL (ref 32–36)
MCV RBC AUTO: 89 FL (ref 82–98)
MICROALBUMIN UR-MCNC: 321 UG/ML (ref ?–5000)
NONHDLC SERPL-MCNC: 92 MG/DL
NUCLEATED RBC (/100WBC) (OHS): 0 /100 WBC
PLATELET # BLD AUTO: 263 K/UL (ref 150–450)
PMV BLD AUTO: 10.8 FL (ref 9.2–12.9)
POTASSIUM SERPL-SCNC: 5.4 MMOL/L (ref 3.5–5.1)
PROT SERPL-MCNC: 7.8 GM/DL (ref 6–8.4)
RBC # BLD AUTO: 5.51 M/UL (ref 4.6–6.2)
RELATIVE EOSINOPHIL (OHS): 3 %
RELATIVE LYMPHOCYTE (OHS): 23.9 % (ref 18–48)
RELATIVE MONOCYTE (OHS): 7.9 % (ref 4–15)
RELATIVE NEUTROPHIL (OHS): 63.5 % (ref 38–73)
SODIUM SERPL-SCNC: 141 MMOL/L (ref 136–145)
TRIGL SERPL-MCNC: 100 MG/DL (ref 30–150)
TSH SERPL-ACNC: 0.98 UIU/ML (ref 0.4–4)
WBC # BLD AUTO: 10.72 K/UL (ref 3.9–12.7)

## 2025-07-21 PROCEDURE — 80053 COMPREHEN METABOLIC PANEL: CPT

## 2025-07-21 PROCEDURE — 80061 LIPID PANEL: CPT

## 2025-07-21 PROCEDURE — 82043 UR ALBUMIN QUANTITATIVE: CPT

## 2025-07-21 PROCEDURE — 36415 COLL VENOUS BLD VENIPUNCTURE: CPT

## 2025-07-21 PROCEDURE — 84443 ASSAY THYROID STIM HORMONE: CPT

## 2025-07-21 PROCEDURE — 85025 COMPLETE CBC W/AUTO DIFF WBC: CPT

## (undated) DEVICE — SYR 10CC LUER LOCK

## (undated) DEVICE — SUT 3-0 VICRYL / SH (J416)

## (undated) DEVICE — STRAP KNEE & BODY DISP 4X34IN

## (undated) DEVICE — APPLICATOR CHLORAPREP ORN 26ML

## (undated) DEVICE — NDL 18GA X1 1/2 REG BEVEL

## (undated) DEVICE — GAUZE CNFRM STRTCH STRL 4X75IN

## (undated) DEVICE — DRAPE T EXTRM SURG 121X128X90

## (undated) DEVICE — CLEANER CAUT TIP STRL 2X2IN

## (undated) DEVICE — GOWN NONREINF SET-IN SLV XL

## (undated) DEVICE — BANDAGE GAUZE COT STRL 4.5X4.1

## (undated) DEVICE — BLANKET UPPER BODY 78.7X29.9IN

## (undated) DEVICE — LINER GLOVE POWDERFREE SZ 6.5

## (undated) DEVICE — UNDERPAD DELUXE FLUFF 30X30IN

## (undated) DEVICE — LINER SUCTION CANNISTER REGUGA

## (undated) DEVICE — SUT CTD VICRYL 2.0

## (undated) DEVICE — BANDAGE MATRIX HK LOOP 4IN 5YD

## (undated) DEVICE — SKIN MARKER STER DUAL TIP

## (undated) DEVICE — COVER OVERHEAD SURG LT BLUE

## (undated) DEVICE — ELECTRODE REM PLYHSV RETURN 9

## (undated) DEVICE — CAUTERY PUSHBUTTON PENCIL

## (undated) DEVICE — NDL HYPO REG 25G X 1 1/2

## (undated) DEVICE — TOURNIQUET SB QC DP 18X4IN

## (undated) DEVICE — SUT 3-0 ETHILON 18 FS-1

## (undated) DEVICE — BLADE SURG #15 CARBON STEEL

## (undated) DEVICE — GLOVE SURGICAL LATEX SZ 6.5

## (undated) DEVICE — TRAY MAJOR ORTHO SPILT SURG

## (undated) DEVICE — SOL NACL IRR 1000ML BTL

## (undated) DEVICE — GLOVE SENSICARE PI SURG 6